# Patient Record
Sex: FEMALE | Race: BLACK OR AFRICAN AMERICAN | NOT HISPANIC OR LATINO | URBAN - METROPOLITAN AREA
[De-identification: names, ages, dates, MRNs, and addresses within clinical notes are randomized per-mention and may not be internally consistent; named-entity substitution may affect disease eponyms.]

---

## 2017-02-14 ENCOUNTER — HOSPITAL ENCOUNTER (OUTPATIENT)
Dept: RADIOLOGY | Facility: HOSPITAL | Age: 27
Discharge: HOME | End: 2017-02-14
Attending: OTOLARYNGOLOGY
Payer: MEDICAID

## 2017-02-14 DIAGNOSIS — J32.9 UNSPECIFIED SINUSITIS (CHRONIC): ICD-10-CM

## 2017-02-14 PROCEDURE — 70220 X-RAY EXAM OF SINUSES: CPT | Mod: TC,PO

## 2018-10-16 ENCOUNTER — HOSPITAL ENCOUNTER (EMERGENCY)
Facility: HOSPITAL | Age: 28
Discharge: HOME | End: 2018-10-16
Payer: MEDICAID

## 2018-10-16 VITALS
SYSTOLIC BLOOD PRESSURE: 135 MMHG | DIASTOLIC BLOOD PRESSURE: 64 MMHG | HEIGHT: 65 IN | TEMPERATURE: 98 F | OXYGEN SATURATION: 100 % | HEART RATE: 82 BPM | BODY MASS INDEX: 29.95 KG/M2 | RESPIRATION RATE: 20 BRPM

## 2018-10-16 DIAGNOSIS — G89.29 CHRONIC PAIN OF RIGHT ANKLE: ICD-10-CM

## 2018-10-16 DIAGNOSIS — S93.401A SPRAIN OF RIGHT ANKLE, UNSPECIFIED LIGAMENT, INITIAL ENCOUNTER: Primary | ICD-10-CM

## 2018-10-16 DIAGNOSIS — M25.571 ANKLE PAIN, RIGHT: ICD-10-CM

## 2018-10-16 DIAGNOSIS — M25.571 CHRONIC PAIN OF RIGHT ANKLE: ICD-10-CM

## 2018-10-16 PROCEDURE — 99283 EMERGENCY DEPT VISIT LOW MDM: CPT

## 2018-10-16 RX ORDER — IBUPROFEN 800 MG/1
800 TABLET ORAL EVERY 6 HOURS PRN
Qty: 20 TABLET | Refills: 0 | Status: SHIPPED | OUTPATIENT
Start: 2018-10-16 | End: 2018-12-27 | Stop reason: ALTCHOICE

## 2018-10-16 NOTE — ED PROVIDER NOTES
Encounter Date: 10/16/2018       History     Chief Complaint   Patient presents with    Ankle Pain     Pt reports recent fall a couple days ago. Pt reports right ankle pain     A 28-year-old female here today complaining of right ankle pain. Patient reports that approximately 7 months prior she had a fall which subsequently resulted in ankle pain as well as occasional giving out of her ankle.  She reports that she never received follow-up after that injury. Patient reports that 2 days ago she was walking when her ankle gave out again-causing her pain. She reports that she is able to bear weight on the affected extremity; however, pain is worse with lateral movement of the ankle.            Review of patient's allergies indicates:  No Known Allergies  Past Medical History:   Diagnosis Date    Breast disorder      Past Surgical History:   Procedure Laterality Date    BREAST SURGERY      HERNIA REPAIR      REDUCTION-MAMMOPLASTY-BILATERAL( 2 hour procedure) Bilateral 10/30/2014    Performed by Ramesh Ledesma MD at [facility] OR [address] [place] [address]    TONSILLECTOMY      5th Grade     Family History   Problem Relation Age of Onset    Diabetes Mother     Breast cancer Mother     Diabetes Maternal Grandmother     Diabetes Maternal Grandfather     Diabetes Paternal Grandmother      Social History     Tobacco Use    Smoking status: Never Smoker    Smokeless tobacco: Never Used   Substance Use Topics    Alcohol use: No    Drug use: No     Review of Systems   Musculoskeletal: Positive for arthralgias and joint swelling. Negative for back pain.   Skin: Negative for color change and rash.   Neurological: Positive for numbness ( occasional to sole of foot. ).   All other systems reviewed and are negative.      Physical Exam     Initial Vitals [10/16/18 1640]   BP Pulse Resp Temp SpO2   135/64 82 20 98.4 °F (36.9 °C) 100 %      MAP       --         Physical Exam    Nursing note and vitals reviewed.  Constitutional: She  appears well-developed and well-nourished. She is not diaphoretic. No distress.   HENT:   Head: Normocephalic and atraumatic.   Right Ear: External ear normal.   Left Ear: External ear normal.   Nose: Nose normal.   Mouth/Throat: Oropharynx is clear and moist.   Eyes: Conjunctivae and EOM are normal.   Neck: Normal range of motion.   Cardiovascular:   +2 DP pulse to right foot.    Pulmonary/Chest: No respiratory distress.   Respirations are even and non-labored.    Musculoskeletal:   Mild right lateral ankle tenderness with mild swelling. No joint laxity but pain with eversion.   Neurological: She is alert and oriented to person, place, and time.   Skin: Skin is warm and dry.   No ecchymosis    Psychiatric: She has a normal mood and affect. Her behavior is normal. Thought content normal.         ED Course   Orthopedic Injury  Date/Time: 10/16/2018 5:18 PM  Performed by: Kelsi Keen, Patient Care Assistant  Authorized by: Virginie Suggs NP     Injury:     Injury location:  Ankle    Injury type:  Soft tissue      Pre-procedure assessment:     Neurovascular status: Neurovascularly intact      Distal perfusion: normal      Neurological function: normal      Range of motion: normal        Selections made in this section will also lock the Injury type section above.:     Supplies used:  Elastic bandage  Post-procedure assessment:     Neurovascular status: Neurovascularly intact      Distal perfusion: normal      Neurological function: normal      Range of motion: unchanged      Patient tolerance:  Patient tolerated the procedure well with no immediate complications      Labs Reviewed - No data to display       Imaging Results          X-Ray Ankle Complete Right (Final result)  Result time 10/16/18 16:52:27    Final result by MICHAEL Owen Sr., MD (10/16/18 16:52:27)                 Impression:      Normal study.      Electronically signed by: Goran Owen MD  Date:    10/16/2018  Time:    16:52              Narrative:    EXAMINATION:  XR ANKLE COMPLETE 3 VIEW RIGHT    CLINICAL HISTORY:  Pain in right ankle and joints of right foot    COMPARISON:  None    FINDINGS:  There is no fracture. There is no dislocation.                                 Medical Decision Making:   Clinical Tests:   Radiological Study: Ordered and Reviewed  ED Management:  No acute bony abnormality seen on imaging.  However, due to patient's duration of injury and repeated laxity of joint, will refer to [facility].  Patient given disc of her imaging at discharge as well as contact information for Ortho.  Patient has a she faxed [facility].  Patient's ankle wrapped with Ace,  crutches provided, and patient to treat pain with NSAIDs and RICE.  Patient verbalized agreement and understanding of treatment plan prior to discharge.                      Clinical Impression:   1.  Sprain of right ankle.  2.  Chronic pain right ankle.    Disposition:   Disposition: Discharged                        Virginie Suggs NP  10/16/18 3094

## 2018-12-27 ENCOUNTER — HOSPITAL ENCOUNTER (EMERGENCY)
Facility: HOSPITAL | Age: 28
Discharge: HOME | End: 2018-12-27
Attending: EMERGENCY MEDICINE
Payer: MEDICAID

## 2018-12-27 VITALS
WEIGHT: 220 LBS | HEART RATE: 93 BPM | RESPIRATION RATE: 19 BRPM | TEMPERATURE: 98 F | HEIGHT: 65 IN | OXYGEN SATURATION: 99 % | BODY MASS INDEX: 36.65 KG/M2

## 2018-12-27 DIAGNOSIS — S00.83XA CONTUSION OF FACE, INITIAL ENCOUNTER: Primary | ICD-10-CM

## 2018-12-27 PROCEDURE — 99284 EMERGENCY DEPT VISIT MOD MDM: CPT

## 2018-12-27 RX ORDER — CYCLOBENZAPRINE HCL 10 MG
10 TABLET ORAL 3 TIMES DAILY PRN
Qty: 15 TABLET | Refills: 0 | Status: SHIPPED | OUTPATIENT
Start: 2018-12-27 | End: 2019-01-01

## 2018-12-27 RX ORDER — NAPROXEN 500 MG/1
500 TABLET ORAL 2 TIMES DAILY PRN
Qty: 30 TABLET | Refills: 0 | Status: SHIPPED | OUTPATIENT
Start: 2018-12-27 | End: 2019-06-06
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
## 2018-12-27 NOTE — DISCHARGE INSTRUCTIONS
Thank you for choosing [facility]! We appreciate you coming to us for your medical care. We hope you feel better soon! Please come back to [facility] for all of your future medical needs.      Sincerely,    Mario Graves MD  Medical Director  Emergency Department  [facility] [place]

## 2018-12-28 NOTE — ED PROVIDER NOTES
Encounter Date: 12/27/2018       History     Chief Complaint   Patient presents with    Facial Injury     Patients child threw her head back hitting patient on right side of face last night. Complains of left sided upper and lower jaw pain. No facial swelling noted.      HPI   This is a 28 y.o. female who has a past medical history of Breast disorder.   The patient presents to the Emergency Department with right facial injury.  Patient states that she was holding her 11-month-old child with that child threw her head back suddenly and hit the right side of her face last night.  Patient denies any pain at that time.  She states that she woke up with pain this morning associated with mild swelling.  She also has difficulty opening up her mouth chewing secondary to pain. She denies any broken teeth, bleeding in her mouth.    She has no history of previous symptoms.    Review of patient's allergies indicates:  No Known Allergies  Past Medical History:   Diagnosis Date    Breast disorder      Past Surgical History:   Procedure Laterality Date    BREAST SURGERY      HERNIA REPAIR      REDUCTION-MAMMOPLASTY-BILATERAL( 2 hour procedure) Bilateral 10/30/2014    Performed by Ramesh Ledesma MD at [facility] OR [address] [place] [address]    TONSILLECTOMY      5th Grade     Family History   Problem Relation Age of Onset    Diabetes Mother     Breast cancer Mother     Diabetes Maternal Grandmother     Diabetes Maternal Grandfather     Diabetes Paternal Grandmother      Social History     Tobacco Use    Smoking status: Never Smoker    Smokeless tobacco: Never Used   Substance Use Topics    Alcohol use: No    Drug use: No     Review of Systems   Constitutional: Negative for activity change.   HENT: Positive for facial swelling.         Facial pain   Musculoskeletal: Negative for neck pain.       Physical Exam     Initial Vitals [12/27/18 1507]   BP Pulse Resp Temp SpO2   -- 93 19 97.7 °F (36.5 °C) 99 %      MAP       --          Physical Exam    Nursing note and vitals reviewed.  Constitutional: She appears well-developed and well-nourished. She is not diaphoretic. No distress.   HENT:   Head: Normocephalic.   Mouth/Throat: Oropharynx is clear and moist.   Mild swelling and tenderness along the right masseter muscle/mandibular ramus.  Patient is able to open mouth to 2 fingers with.  There is no dental trauma/fractures, there are no intraoral lacerations.  Patient able to bite tongue depressor bilaterally, although stronger on the left.   Eyes: Conjunctivae are normal.   Cardiovascular: Normal rate, regular rhythm and intact distal pulses.   Pulmonary/Chest: No respiratory distress.   Musculoskeletal: Normal range of motion.   Neurological: She is alert and oriented to person, place, and time.   Skin: Skin is warm and dry. Capillary refill takes less than 2 seconds. No rash noted. No erythema.   Psychiatric: She has a normal mood and affect.         ED Course   Procedures  Labs Reviewed - No data to display       Imaging Results    None          Medical Decision Making:   Initial Assessment:   This is an emergent evaluation of a 28 y.o.female patient with presentation of right-sided jaw pain.  Patient has no instability, no trismus.   Initial differentials include but are not limited to:  Facial contusion, doubt mandibular fracture or TMJ dysfunction.  Patient had no pain initially and on exam has minimal swelling and is able to bite and open mouth normally.  Plan:  Pain control and discharge                        Clinical Impression:     1. Contusion of face, initial encounter                                  Mario Graves MD  12/27/18 [id number]

## 2019-02-19 DIAGNOSIS — N63.0 BREAST LUMP: Primary | ICD-10-CM

## 2019-02-20 ENCOUNTER — HOSPITAL ENCOUNTER (OUTPATIENT)
Dept: RADIOLOGY | Facility: HOSPITAL | Age: 29
Discharge: HOME | End: 2019-02-20
Attending: OBSTETRICS & GYNECOLOGY
Payer: MEDICAID

## 2019-02-20 DIAGNOSIS — N63.0 BREAST LUMP: ICD-10-CM

## 2019-02-20 PROCEDURE — 76642 ULTRASOUND BREAST LIMITED: CPT | Mod: TC,PO,RT

## 2019-06-03 ENCOUNTER — HOSPITAL ENCOUNTER (EMERGENCY)
Facility: HOSPITAL | Age: 29
Discharge: HOME | End: 2019-06-03
Attending: FAMILY MEDICINE
Payer: MEDICAID

## 2019-06-03 VITALS
HEART RATE: 89 BPM | SYSTOLIC BLOOD PRESSURE: 121 MMHG | TEMPERATURE: 99 F | RESPIRATION RATE: 20 BRPM | OXYGEN SATURATION: 100 % | HEIGHT: 65 IN | WEIGHT: 230 LBS | DIASTOLIC BLOOD PRESSURE: 64 MMHG | BODY MASS INDEX: 38.32 KG/M2

## 2019-06-03 DIAGNOSIS — N30.01 ACUTE CYSTITIS WITH HEMATURIA: Primary | ICD-10-CM

## 2019-06-03 LAB
ALBUMIN SERPL BCP-MCNC: 4.3 G/DL (ref 3.5–5.2)
ALP SERPL-CCNC: 86 U/L (ref 38–126)
ALT SERPL W/O P-5'-P-CCNC: 16 U/L (ref 10–44)
ANION GAP SERPL CALC-SCNC: 10 MMOL/L (ref 8–16)
AST SERPL-CCNC: 22 U/L (ref 15–46)
B-HCG UR QL: NEGATIVE
BACTERIA #/AREA URNS AUTO: ABNORMAL /HPF
BASOPHILS # BLD AUTO: 0.02 K/UL (ref 0–0.2)
BASOPHILS NFR BLD: 0.2 % (ref 0–1.9)
BILIRUB SERPL-MCNC: 0.3 MG/DL (ref 0.1–1)
BILIRUB UR QL STRIP: NEGATIVE
BUN SERPL-MCNC: 14 MG/DL (ref 7–17)
CALCIUM SERPL-MCNC: 9.7 MG/DL (ref 8.7–10.5)
CHLORIDE SERPL-SCNC: 101 MMOL/L (ref 95–110)
CLARITY UR REFRACT.AUTO: ABNORMAL
CO2 SERPL-SCNC: 28 MMOL/L (ref 23–29)
COLOR UR AUTO: ABNORMAL
CREAT SERPL-MCNC: 0.82 MG/DL (ref 0.5–1.4)
DIFFERENTIAL METHOD: ABNORMAL
EOSINOPHIL # BLD AUTO: 0.2 K/UL (ref 0–0.5)
EOSINOPHIL NFR BLD: 1.3 % (ref 0–8)
ERYTHROCYTE [DISTWIDTH] IN BLOOD BY AUTOMATED COUNT: 14.2 % (ref 11.5–14.5)
EST. GFR  (AFRICAN AMERICAN): >60 ML/MIN/1.73 M^2
EST. GFR  (NON AFRICAN AMERICAN): >60 ML/MIN/1.73 M^2
GLUCOSE SERPL-MCNC: 98 MG/DL (ref 70–110)
GLUCOSE UR QL STRIP: NEGATIVE
HCT VFR BLD AUTO: 36.4 % (ref 37–48.5)
HGB BLD-MCNC: 12 G/DL (ref 12–16)
HGB UR QL STRIP: ABNORMAL
HYALINE CASTS UR QL AUTO: 0 /LPF
KETONES UR QL STRIP: NEGATIVE
LEUKOCYTE ESTERASE UR QL STRIP: ABNORMAL
LYMPHOCYTES # BLD AUTO: 3.5 K/UL (ref 1–4.8)
LYMPHOCYTES NFR BLD: 29.9 % (ref 18–48)
MCH RBC QN AUTO: 27.1 PG (ref 27–31)
MCHC RBC AUTO-ENTMCNC: 33 G/DL (ref 32–36)
MCV RBC AUTO: 82 FL (ref 82–98)
MICROSCOPIC COMMENT: ABNORMAL
MONOCYTES # BLD AUTO: 0.7 K/UL (ref 0.3–1)
MONOCYTES NFR BLD: 5.6 % (ref 4–15)
NEUTROPHILS # BLD AUTO: 7.4 K/UL (ref 1.8–7.7)
NEUTROPHILS NFR BLD: 62.8 % (ref 38–73)
NITRITE UR QL STRIP: NEGATIVE
PH UR STRIP: 7 [PH] (ref 5–8)
PLATELET # BLD AUTO: 456 K/UL (ref 150–350)
PMV BLD AUTO: 9.8 FL (ref 9.2–12.9)
POTASSIUM SERPL-SCNC: 4 MMOL/L (ref 3.5–5.1)
PROT SERPL-MCNC: 8.3 G/DL (ref 6–8.4)
PROT UR QL STRIP: ABNORMAL
RBC # BLD AUTO: 4.42 M/UL (ref 4–5.4)
RBC #/AREA URNS AUTO: >100 /HPF (ref 0–4)
SODIUM SERPL-SCNC: 139 MMOL/L (ref 136–145)
SP GR UR STRIP: 1.01 (ref 1–1.03)
URN SPEC COLLECT METH UR: ABNORMAL
UROBILINOGEN UR STRIP-ACNC: NEGATIVE EU/DL
WBC # BLD AUTO: 11.81 K/UL (ref 3.9–12.7)
WBC #/AREA URNS AUTO: 20 /HPF (ref 0–5)
WBC CLUMPS UR QL AUTO: ABNORMAL

## 2019-06-03 PROCEDURE — 81025 URINE PREGNANCY TEST: CPT | Mod: ER

## 2019-06-03 PROCEDURE — 63600175 PHARM REV CODE 636 W HCPCS: Mod: ER | Performed by: FAMILY MEDICINE

## 2019-06-03 PROCEDURE — 87186 SC STD MICRODIL/AGAR DIL: CPT | Mod: ER

## 2019-06-03 PROCEDURE — 87077 CULTURE AEROBIC IDENTIFY: CPT | Mod: ER

## 2019-06-03 PROCEDURE — 80053 COMPREHEN METABOLIC PANEL: CPT | Mod: ER

## 2019-06-03 PROCEDURE — 96361 HYDRATE IV INFUSION ADD-ON: CPT | Mod: ER

## 2019-06-03 PROCEDURE — 87086 URINE CULTURE/COLONY COUNT: CPT | Mod: ER

## 2019-06-03 PROCEDURE — 99284 EMERGENCY DEPT VISIT MOD MDM: CPT | Mod: 25,ER

## 2019-06-03 PROCEDURE — 87040 BLOOD CULTURE FOR BACTERIA: CPT | Mod: ER

## 2019-06-03 PROCEDURE — 96365 THER/PROPH/DIAG IV INF INIT: CPT | Mod: ER

## 2019-06-03 PROCEDURE — 81000 URINALYSIS NONAUTO W/SCOPE: CPT | Mod: ER

## 2019-06-03 PROCEDURE — 25000003 PHARM REV CODE 250: Mod: ER | Performed by: FAMILY MEDICINE

## 2019-06-03 PROCEDURE — 96375 TX/PRO/DX INJ NEW DRUG ADDON: CPT | Mod: ER

## 2019-06-03 PROCEDURE — 85025 COMPLETE CBC W/AUTO DIFF WBC: CPT | Mod: ER

## 2019-06-03 PROCEDURE — 87088 URINE BACTERIA CULTURE: CPT | Mod: ER

## 2019-06-03 RX ORDER — TRAMADOL HYDROCHLORIDE 50 MG/1
50 TABLET ORAL EVERY 8 HOURS PRN
Qty: 9 TABLET | Refills: 0 | Status: SHIPPED | OUTPATIENT
Start: 2019-06-03

## 2019-06-03 RX ORDER — SULFAMETHOXAZOLE AND TRIMETHOPRIM 800; 160 MG/1; MG/1
1 TABLET ORAL 2 TIMES DAILY
Qty: 20 TABLET | Refills: 0 | Status: SHIPPED | OUTPATIENT
Start: 2019-06-03 | End: 2019-06-13

## 2019-06-03 RX ORDER — ONDANSETRON 4 MG/1
4 TABLET, ORALLY DISINTEGRATING ORAL
Status: COMPLETED | OUTPATIENT
Start: 2019-06-03 | End: 2019-06-03

## 2019-06-03 RX ORDER — SULFAMETHOXAZOLE AND TRIMETHOPRIM 800; 160 MG/1; MG/1
1 TABLET ORAL
Status: COMPLETED | OUTPATIENT
Start: 2019-06-03 | End: 2019-06-03

## 2019-06-03 RX ORDER — ACETAMINOPHEN 325 MG/1
650 TABLET ORAL
Status: COMPLETED | OUTPATIENT
Start: 2019-06-03 | End: 2019-06-03

## 2019-06-03 RX ORDER — KETOROLAC TROMETHAMINE 30 MG/ML
15 INJECTION, SOLUTION INTRAMUSCULAR; INTRAVENOUS
Status: COMPLETED | OUTPATIENT
Start: 2019-06-03 | End: 2019-06-03

## 2019-06-03 RX ADMIN — ACETAMINOPHEN 650 MG: 325 TABLET ORAL at 01:06

## 2019-06-03 RX ADMIN — ONDANSETRON 4 MG: 4 TABLET, ORALLY DISINTEGRATING ORAL at 01:06

## 2019-06-03 RX ADMIN — KETOROLAC TROMETHAMINE 15 MG: 30 INJECTION, SOLUTION INTRAMUSCULAR at 05:06

## 2019-06-03 RX ADMIN — SULFAMETHOXAZOLE AND TRIMETHOPRIM 1 TABLET: 800; 160 TABLET ORAL at 02:06

## 2019-06-03 RX ADMIN — SODIUM CHLORIDE 1000 ML: 0.9 INJECTION, SOLUTION INTRAVENOUS at 02:06

## 2019-06-03 RX ADMIN — CEFTRIAXONE 1 G: 1 INJECTION, SOLUTION INTRAVENOUS at 02:06

## 2019-06-03 NOTE — ED PROVIDER NOTES
Encounter Date: 6/3/2019       History     Chief Complaint   Patient presents with    Back Pain     pt to er c/o lower back pain with hematuria and dysuria for few hours--denies trauma--pt finished Amoxil yesterday--reports nausea      29-year-old female complains of low back pain, suprapubic pain and dysuria.  Complains of fever with chills and hematuria.  She has not taken any medications so far.  Patient was recently taking amoxicillin for to take and finished few days ago.    The history is provided by the patient.     Review of patient's allergies indicates:  No Known Allergies  Past Medical History:   Diagnosis Date    Breast disorder      Past Surgical History:   Procedure Laterality Date    BREAST SURGERY      HERNIA REPAIR      REDUCTION-MAMMOPLASTY-BILATERAL( 2 hour procedure) Bilateral 10/30/2014    Performed by Ramesh Led[facility]ma MD at [facility] OR [address] [place] [address]    TONSILLECTOMY      5th Grade     Family History   Problem Relation Age of Onset    Diabetes Mother     Breast cancer Mother     Diabetes Maternal Grandmother     Diabetes Maternal Grandfather     Diabetes Paternal Grandmother      Social History     Tobacco Use    Smoking status: Never Smoker    Smokeless tobacco: Never Used   Substance Use Topics    Alcohol use: No    Drug use: No     Review of Systems   Constitutional: Positive for fever. Negative for activity change, appetite change and chills.   HENT: Negative for congestion, ear discharge, rhinorrhea, sinus pressure, sinus pain, sore throat and trouble swallowing.    Eyes: Negative for photophobia, pain, discharge, redness, itching and visual disturbance.   Respiratory: Negative for cough, chest tightness, shortness of breath and wheezing.    Cardiovascular: Negative for chest pain, palpitations and leg swelling.   Gastrointestinal: Negative for abdominal distention, abdominal pain, constipation, diarrhea, nausea and vomiting.   Genitourinary: Positive for dysuria, flank pain,  frequency and hematuria. Negative for decreased urine volume, vaginal bleeding, vaginal discharge and vaginal pain.   Musculoskeletal: Positive for back pain. Negative for gait problem, neck pain and neck stiffness.   Skin: Negative for rash and wound.   Neurological: Negative for dizziness, tremors, seizures, syncope, speech difficulty, weakness, light-headedness, numbness and headaches.   Psychiatric/Behavioral: Negative for behavioral problems, confusion, hallucinations and sleep disturbance. The patient is not nervous/anxious.    All other systems reviewed and are negative.      Physical Exam     Initial Vitals [06/03/19 0106]   BP Pulse Resp Temp SpO2   139/69 105 18 100.2 °F (37.9 °C) 100 %      MAP       --         Physical Exam    Nursing note and vitals reviewed.  Constitutional: Vital signs are normal. She appears well-developed and well-nourished. She is active.   HENT:   Head: Normocephalic and atraumatic.   Nose: Nose normal.   Mouth/Throat: Oropharynx is clear and moist.   Eyes: Conjunctivae and lids are normal.   Neck: Trachea normal, normal range of motion and full passive range of motion without pain. Neck supple. Normal range of motion present. No neck rigidity.   Cardiovascular: Normal rate, regular rhythm, S1 normal, S2 normal, normal heart sounds, intact distal pulses and normal pulses. Exam reveals no friction rub.    No murmur heard.  Pulmonary/Chest: Breath sounds normal. No respiratory distress. She has no wheezes. She has no rales. She exhibits no tenderness.   Abdominal: Soft. Normal appearance and bowel sounds are normal. She exhibits no distension. There is tenderness.       Musculoskeletal: Normal range of motion. She exhibits no edema or tenderness.   Lymphadenopathy:     She has no cervical adenopathy.   Neurological: She is alert and oriented to person, place, and time. She has normal strength and normal reflexes. She displays normal reflexes. No cranial nerve deficit or sensory  deficit. GCS score is 15. GCS eye subscore is 4. GCS verbal subscore is 5. GCS motor subscore is 6.   Skin: Skin is warm and intact. Capillary refill takes less than 2 seconds. No abrasion, no bruising and no rash noted.   Psychiatric: She has a normal mood and affect. Her speech is normal and behavior is normal. Judgment and thought content normal. She is not actively hallucinating. Cognition and memory are normal. She is attentive.         ED Course   Procedures  Labs Reviewed   URINALYSIS, REFLEX TO URINE CULTURE          Imaging Results    None          Medical Decision Making:   Initial Assessment:   29-year-old female presents with dysuria, hematuria, low back pain. Patient had fever of 101.  Mild nausea but no vomiting.  Differential Diagnosis:   Pyelonephritis, kidney stone, acute cystitis, urinary tract infection, hematuria bladder cancer.  Clinical Tests:   Lab Tests: Ordered and Reviewed  Radiological Study: Ordered and Reviewed  ED Management:  Patient had significant blood in the urine associated with urinary tract infection.  CT negative for pyelonephritis or kidney stone.  Hydrated with IV fluids, Rocephin and Bactrim.  Urine has been sent for culture.  Patient is advised to drink plenty of water.  Continue Bactrim.  Ultram for severe pain. Follow up with primary care physician or to the ER if symptoms persist or worsen.                      Clinical Impression:       ICD-10-CM ICD-9-CM   1. Acute cystitis with hematuria N30.01 595.0         Disposition:   Disposition: Discharged  Condition: Stable                        Grzegorz Montgomery MD  06/03/19 2754

## 2019-06-06 ENCOUNTER — HOSPITAL ENCOUNTER (EMERGENCY)
Facility: HOSPITAL | Age: 29
Discharge: HOME | End: 2019-06-06
Attending: EMERGENCY MEDICINE
Payer: MEDICAID

## 2019-06-06 VITALS
HEIGHT: 65 IN | DIASTOLIC BLOOD PRESSURE: 66 MMHG | OXYGEN SATURATION: 99 % | HEART RATE: 94 BPM | BODY MASS INDEX: 36.65 KG/M2 | TEMPERATURE: 99 F | WEIGHT: 220 LBS | RESPIRATION RATE: 16 BRPM | SYSTOLIC BLOOD PRESSURE: 134 MMHG

## 2019-06-06 DIAGNOSIS — N12 PYELONEPHRITIS: Primary | ICD-10-CM

## 2019-06-06 LAB
ANION GAP SERPL CALC-SCNC: 8 MMOL/L (ref 8–16)
B-HCG UR QL: NEGATIVE
BACTERIA #/AREA URNS HPF: ABNORMAL /HPF
BACTERIA UR CULT: NORMAL
BASOPHILS # BLD AUTO: 0.01 K/UL (ref 0–0.2)
BASOPHILS NFR BLD: 0.1 % (ref 0–1.9)
BILIRUB UR QL STRIP: NEGATIVE
BUN SERPL-MCNC: 12 MG/DL (ref 6–20)
CALCIUM SERPL-MCNC: 9.8 MG/DL (ref 8.7–10.5)
CHLORIDE SERPL-SCNC: 103 MMOL/L (ref 95–110)
CLARITY UR: ABNORMAL
CO2 SERPL-SCNC: 25 MMOL/L (ref 23–29)
COLOR UR: YELLOW
CREAT SERPL-MCNC: 0.9 MG/DL (ref 0.5–1.4)
CTP QC/QA: YES
DIFFERENTIAL METHOD: ABNORMAL
EOSINOPHIL # BLD AUTO: 0.1 K/UL (ref 0–0.5)
EOSINOPHIL NFR BLD: 0.7 % (ref 0–8)
ERYTHROCYTE [DISTWIDTH] IN BLOOD BY AUTOMATED COUNT: 13.8 % (ref 11.5–14.5)
EST. GFR  (AFRICAN AMERICAN): >60 ML/MIN/1.73 M^2
EST. GFR  (NON AFRICAN AMERICAN): >60 ML/MIN/1.73 M^2
GLUCOSE SERPL-MCNC: 86 MG/DL (ref 70–110)
GLUCOSE UR QL STRIP: NEGATIVE
HCT VFR BLD AUTO: 37 % (ref 37–48.5)
HGB BLD-MCNC: 12.1 G/DL (ref 12–16)
HGB UR QL STRIP: ABNORMAL
KETONES UR QL STRIP: NEGATIVE
LEUKOCYTE ESTERASE UR QL STRIP: ABNORMAL
LYMPHOCYTES # BLD AUTO: 2.3 K/UL (ref 1–4.8)
LYMPHOCYTES NFR BLD: 27.7 % (ref 18–48)
MCH RBC QN AUTO: 27.1 PG (ref 27–31)
MCHC RBC AUTO-ENTMCNC: 32.7 G/DL (ref 32–36)
MCV RBC AUTO: 83 FL (ref 82–98)
MICROSCOPIC COMMENT: ABNORMAL
MONOCYTES # BLD AUTO: 0.5 K/UL (ref 0.3–1)
MONOCYTES NFR BLD: 5.6 % (ref 4–15)
NEUTROPHILS # BLD AUTO: 5.5 K/UL (ref 1.8–7.7)
NEUTROPHILS NFR BLD: 65.5 % (ref 38–73)
NITRITE UR QL STRIP: NEGATIVE
PH UR STRIP: 7 [PH] (ref 5–8)
PLATELET # BLD AUTO: 426 K/UL (ref 150–350)
PMV BLD AUTO: 9.2 FL (ref 9.2–12.9)
POTASSIUM SERPL-SCNC: 3.8 MMOL/L (ref 3.5–5.1)
PROT UR QL STRIP: NEGATIVE
RBC # BLD AUTO: 4.46 M/UL (ref 4–5.4)
RBC #/AREA URNS HPF: 5 /HPF (ref 0–4)
SODIUM SERPL-SCNC: 136 MMOL/L (ref 136–145)
SP GR UR STRIP: <=1.005 (ref 1–1.03)
URN SPEC COLLECT METH UR: ABNORMAL
UROBILINOGEN UR STRIP-ACNC: NEGATIVE EU/DL
WBC # BLD AUTO: 8.34 K/UL (ref 3.9–12.7)
WBC #/AREA URNS HPF: 35 /HPF (ref 0–5)

## 2019-06-06 PROCEDURE — 87086 URINE CULTURE/COLONY COUNT: CPT

## 2019-06-06 PROCEDURE — 85025 COMPLETE CBC W/AUTO DIFF WBC: CPT

## 2019-06-06 PROCEDURE — 81025 URINE PREGNANCY TEST: CPT | Performed by: PHYSICIAN ASSISTANT

## 2019-06-06 PROCEDURE — 87186 SC STD MICRODIL/AGAR DIL: CPT

## 2019-06-06 PROCEDURE — 87077 CULTURE AEROBIC IDENTIFY: CPT

## 2019-06-06 PROCEDURE — 81000 URINALYSIS NONAUTO W/SCOPE: CPT

## 2019-06-06 PROCEDURE — 96375 TX/PRO/DX INJ NEW DRUG ADDON: CPT

## 2019-06-06 PROCEDURE — 63600175 PHARM REV CODE 636 W HCPCS: Performed by: PHYSICIAN ASSISTANT

## 2019-06-06 PROCEDURE — 87088 URINE BACTERIA CULTURE: CPT

## 2019-06-06 PROCEDURE — 25000003 PHARM REV CODE 250: Performed by: PHYSICIAN ASSISTANT

## 2019-06-06 PROCEDURE — 99284 EMERGENCY DEPT VISIT MOD MDM: CPT | Mod: 25

## 2019-06-06 PROCEDURE — 80048 BASIC METABOLIC PNL TOTAL CA: CPT

## 2019-06-06 PROCEDURE — 96365 THER/PROPH/DIAG IV INF INIT: CPT

## 2019-06-06 RX ORDER — CIPROFLOXACIN 2 MG/ML
400 INJECTION, SOLUTION INTRAVENOUS
Status: COMPLETED | OUTPATIENT
Start: 2019-06-06 | End: 2019-06-06

## 2019-06-06 RX ORDER — PHENAZOPYRIDINE HYDROCHLORIDE 100 MG/1
200 TABLET, FILM COATED ORAL
Status: COMPLETED | OUTPATIENT
Start: 2019-06-06 | End: 2019-06-06

## 2019-06-06 RX ORDER — NAPROXEN 500 MG/1
500 TABLET ORAL 2 TIMES DAILY WITH MEALS
Qty: 14 TABLET | Refills: 0 | Status: SHIPPED | OUTPATIENT
Start: 2019-06-06 | End: 2022-01-01

## 2019-06-06 RX ORDER — PHENAZOPYRIDINE HYDROCHLORIDE 200 MG/1
200 TABLET, FILM COATED ORAL 3 TIMES DAILY
Qty: 6 TABLET | Refills: 0 | Status: SHIPPED | OUTPATIENT
Start: 2019-06-06 | End: 2019-06-08

## 2019-06-06 RX ORDER — CIPROFLOXACIN 500 MG/1
500 TABLET ORAL 2 TIMES DAILY
Qty: 14 TABLET | Refills: 0 | Status: SHIPPED | OUTPATIENT
Start: 2019-06-06 | End: 2019-06-13

## 2019-06-06 RX ORDER — KETOROLAC TROMETHAMINE 30 MG/ML
15 INJECTION, SOLUTION INTRAMUSCULAR; INTRAVENOUS
Status: COMPLETED | OUTPATIENT
Start: 2019-06-06 | End: 2019-06-06

## 2019-06-06 RX ADMIN — CIPROFLOXACIN 400 MG: 2 INJECTION, SOLUTION INTRAVENOUS at 05:06

## 2019-06-06 RX ADMIN — PHENAZOPYRIDINE HYDROCHLORIDE 200 MG: 100 TABLET ORAL at 05:06

## 2019-06-06 RX ADMIN — KETOROLAC TROMETHAMINE 15 MG: 30 INJECTION, SOLUTION INTRAMUSCULAR at 05:06

## 2019-06-06 RX ADMIN — SODIUM CHLORIDE 1000 ML: 0.9 INJECTION, SOLUTION INTRAVENOUS at 05:06

## 2019-06-06 NOTE — ED TRIAGE NOTES
28 Y/O F with CC of Flank pain. Reports pain to jones flanks, worse to R side, dysuria, frequency and hematuria. Was seen and treated with Bactrim for UTI. States no improvement in symptoms. No other complaints verbalized. NAD noted. Will continue to monitor.

## 2019-06-06 NOTE — ED PROVIDER NOTES
Encounter Date: 6/6/2019       History     Chief Complaint   Patient presents with    Flank Pain     right flank pain, hematuria, and lower back pain for 3-4 days.  Patient was seen at other facility and not any better.     Nadia Mary, a 29 y.o. female  has a past medical history of Breast disorder.     She presents to the ED evaluation of right flank pain. Patient was seen at [facility] on 06/03/19 in diagnosed with a bladder infection.  Upon chart review patient was placed on Bactrim which showed resistance upon culture.  Attempt was made to contact patient however was unsuccessful so patient has not had prescription changed.  She return to the ER today complaining of increased dysuria and urgency with urination.  Denies any fever or vomiting.      The history is provided by the patient.     Review of patient's allergies indicates:  No Known Allergies  Past Medical History:   Diagnosis Date    Breast disorder      Past Surgical History:   Procedure Laterality Date    BREAST SURGERY      HERNIA REPAIR      REDUCTION-MAMMOPLASTY-BILATERAL( 2 hour procedure) Bilateral 10/30/2014    Performed by Ramesh Led[facility] MD at [facility] OR [address] [place] [address]    TONSILLECTOMY      5th Grade     Family History   Problem Relation Age of Onset    Diabetes Mother     Breast cancer Mother     Diabetes Maternal Grandmother     Diabetes Maternal Grandfather     Diabetes Paternal Grandmother      Social History     Tobacco Use    Smoking status: Never Smoker    Smokeless tobacco: Never Used   Substance Use Topics    Alcohol use: No    Drug use: No     Review of Systems   Constitutional: Negative for fever.   Gastrointestinal: Negative for nausea and vomiting.   Genitourinary: Positive for dysuria, flank pain (right sided), frequency, hematuria and urgency.   Skin: Negative for color change and rash.   Allergic/Immunologic: Negative for immunocompromised state.   Psychiatric/Behavioral: Negative for agitation.   All  other systems reviewed and are negative.      Physical Exam     Initial Vitals [06/06/19 1629]   BP Pulse Resp Temp SpO2   134/66 94 16 99 °F (37.2 °C) 99 %      MAP       --         Physical Exam    Nursing note and vitals reviewed.  Constitutional: She appears well-developed and well-nourished.   HENT:   Head: Normocephalic and atraumatic.   Right Ear: External ear normal.   Left Ear: External ear normal.   Nose: Nose normal.   Eyes: Conjunctivae and EOM are normal.   Neck: Normal range of motion.   Cardiovascular: Normal rate and regular rhythm.   Pulmonary/Chest: Breath sounds normal. No respiratory distress. She has no wheezes. She has no rhonchi. She has no rales.   Abdominal: Soft. Bowel sounds are normal. She exhibits no distension. There is no rebound.   Musculoskeletal: Normal range of motion.   Neurological: She is alert and oriented to person, place, and time.   Skin: Skin is warm and dry. Capillary refill takes less than 2 seconds. No rash noted. No erythema.   Psychiatric: She has a normal mood and affect. Thought content normal.         ED Course   Procedures  Labs Reviewed   URINALYSIS, REFLEX TO URINE CULTURE - Abnormal; Notable for the following components:       Result Value    Appearance, UA Hazy (*)     Specific Gravity, UA <=1.005 (*)     Occult Blood UA 3+ (*)     Leukocytes, UA 2+ (*)     All other components within normal limits    Narrative:     Preferred Collection Type->Urine, Clean Catch   CBC W/ AUTO DIFFERENTIAL - Abnormal; Notable for the following components:    Platelets 426 (*)     All other components within normal limits   URINALYSIS MICROSCOPIC - Abnormal; Notable for the following components:    RBC, UA 5 (*)     WBC, UA 35 (*)     Bacteria Few (*)     All other components within normal limits    Narrative:     Preferred Collection Type->Urine, Clean Catch   CULTURE, URINE   BASIC METABOLIC PANEL   POCT URINE PREGNANCY          Imaging Results    None          Medical Decision  Making:   Initial Assessment:   Dysuria, urgency, right sided flank pain   Differential Diagnosis:   UA, pyelo, nephrolithiasis  Clinical Tests:   Lab Tests: Reviewed and Ordered  The following lab test(s) were unremarkable: CBC, BMP, Urinalysis and UPT  ED Management:  Patient presents to the ER for evaluation of continued dysuria as well as right flank pain. Fluids, Toradol, pyridium  and Cipro were given based off of patient's previous urine culture and sensitivity.  CBC and BMP showed no acute abnormalities.  UA shows shows evidence of continued bladder infection.  Patient was instructed to increase hydration, ceased taking the remainder of her Bactrim antibiotic and to have repeat urine after completion of her Cipro antibiotic.  Instructed to return with any new or worsening symptoms including fever, increased pain nausea or vomiting. Patient verbalized understanding and agreement with plan.                      Clinical Impression:       ICD-10-CM ICD-9-CM   1. Pyelonephritis N12 590.80                                Deepa Weiner PA-C  06/07/19 7297

## 2019-06-08 LAB — BACTERIA BLD CULT: NORMAL

## 2019-06-09 LAB — BACTERIA UR CULT: NORMAL

## 2021-04-17 ENCOUNTER — OUTSIDE PLACE OF SERVICE (OUTPATIENT)
Dept: CARDIOLOGY | Facility: CLINIC | Age: 31
End: 2021-04-17
Payer: MEDICAID

## 2021-04-17 PROCEDURE — 93010 PR ELECTROCARDIOGRAM REPORT: ICD-10-PCS | Mod: ,,, | Performed by: INTERNAL MEDICINE

## 2021-04-17 PROCEDURE — 93010 ELECTROCARDIOGRAM REPORT: CPT | Mod: ,,, | Performed by: INTERNAL MEDICINE

## 2022-01-01 ENCOUNTER — HOSPITAL ENCOUNTER (EMERGENCY)
Facility: HOSPITAL | Age: 32
Discharge: HOME | End: 2022-01-01
Attending: EMERGENCY MEDICINE
Payer: MEDICAID

## 2022-01-01 VITALS
DIASTOLIC BLOOD PRESSURE: 68 MMHG | BODY MASS INDEX: 41.1 KG/M2 | HEART RATE: 106 BPM | WEIGHT: 247 LBS | SYSTOLIC BLOOD PRESSURE: 149 MMHG | RESPIRATION RATE: 20 BRPM | TEMPERATURE: 99 F | OXYGEN SATURATION: 99 %

## 2022-01-01 DIAGNOSIS — B34.9 VIRAL SYNDROME: ICD-10-CM

## 2022-01-01 DIAGNOSIS — Z20.822 SUSPECTED COVID-19 VIRUS INFECTION: Primary | ICD-10-CM

## 2022-01-01 LAB — GROUP A STREP, MOLECULAR: NEGATIVE

## 2022-01-01 PROCEDURE — 99282 EMERGENCY DEPT VISIT SF MDM: CPT | Mod: ER

## 2022-01-01 PROCEDURE — U0003 INFECTIOUS AGENT DETECTION BY NUCLEIC ACID (DNA OR RNA); SEVERE ACUTE RESPIRATORY SYNDROME CORONAVIRUS 2 (SARS-COV-2) (CORONAVIRUS DISEASE [COVID-19]), AMPLIFIED PROBE TECHNIQUE, MAKING USE OF HIGH THROUGHPUT TECHNOLOGIES AS DESCRIBED BY CMS-2020-01-R: HCPCS | Mod: ER | Performed by: PHYSICIAN ASSISTANT

## 2022-01-01 PROCEDURE — 87651 STREP A DNA AMP PROBE: CPT | Mod: ER | Performed by: PHYSICIAN ASSISTANT

## 2022-01-01 NOTE — Clinical Note
"Nadia"Saqib Mary was seen and treated in our emergency department on 1/1/2022.     COVID-19 is present in our communities across the state. There is limited testing for COVID at this time, so not all patients can be tested. In this situation, your employee meets the following criteria:    Nadia Mary has met the criteria for COVID-19 testing based upon symptoms, travel, and/or potential exposure. The test has been completed and is pending results at this time. During this time the employee is not able to work and should be quarantined per the Centers for Disease Control timelines.     If you have any questions or concerns, or if I can be of further assistance, please do not hesitate to contact me.    Sincerely,             Svetlana Orourke PA-C"

## 2022-01-02 NOTE — ED PROVIDER NOTES
Encounter Date: 1/1/2022       History     Chief Complaint   Patient presents with    Fever     Reports to ED c c/o fever and sore throat. +Nonproductive cough. States fever of 101 at home. Denies taking any OTC medications      Patient is a 31-year-old female who presents to ED with complaints of fever and sore throat.  Also reports nonproductive cough.  Reports symptoms present for approximately 3-4 days.  No OTC treatments.  Denies any chest pain, shortness of breath, nausea, vomiting, diarrhea.  Recent COVID exposure.        Review of patient's allergies indicates:  No Known Allergies  Past Medical History:   Diagnosis Date    Breast disorder      Past Surgical History:   Procedure Laterality Date    BREAST SURGERY      HERNIA REPAIR      TONSILLECTOMY      5th Grade     Family History   Problem Relation Age of Onset    Diabetes Mother     Breast cancer Mother     Diabetes Maternal Grandmother     Diabetes Maternal Grandfather     Diabetes Paternal Grandmother      Social History     Tobacco Use    Smoking status: Never Smoker    Smokeless tobacco: Never Used   Substance Use Topics    Alcohol use: No    Drug use: No     Review of Systems   Constitutional: Positive for fever. Negative for chills, diaphoresis and fatigue.   HENT: Positive for sore throat. Negative for congestion and rhinorrhea.    Respiratory: Positive for cough. Negative for shortness of breath.    Cardiovascular: Negative for chest pain.   Gastrointestinal: Negative for diarrhea, nausea and vomiting.   Musculoskeletal: Negative for arthralgias, back pain and myalgias.   Skin: Negative for rash.   Neurological: Negative for weakness and headaches.       Physical Exam     Initial Vitals [01/01/22 1921]   BP Pulse Resp Temp SpO2   (!) 149/68 106 20 99.3 °F (37.4 °C) 99 %      MAP       --         Physical Exam    Nursing note and vitals reviewed.  Constitutional: She appears well-developed and well-nourished. She is not diaphoretic.  No distress.   HENT:   Head: Normocephalic and atraumatic.   Eyes: Conjunctivae and EOM are normal. Pupils are equal, round, and reactive to light.   Neck: Neck supple.   Normal range of motion.  Cardiovascular: Normal rate, regular rhythm, normal heart sounds and intact distal pulses.   Pulmonary/Chest: Breath sounds normal. No respiratory distress.   Abdominal: Abdomen is soft. Bowel sounds are normal. There is no abdominal tenderness.   Musculoskeletal:         General: No tenderness or edema. Normal range of motion.      Cervical back: Normal range of motion and neck supple.     Neurological: She is alert and oriented to person, place, and time. She has normal strength. GCS score is 15. GCS eye subscore is 4. GCS verbal subscore is 5. GCS motor subscore is 6.   Skin: Skin is warm. Capillary refill takes less than 2 seconds. No rash noted.         ED Course   Procedures  Labs Reviewed   GROUP A STREP, MOLECULAR   SARS-COV-2 (COVID-19) QUALITATIVE PCR          Imaging Results    None          Medications - No data to display  Medical Decision Making:   ED Management:  COVID pending.   Vital signs are stable, nontoxic appearing.  No respiratory distress.  Patient will be discharged home.  Discussed CDC guidelines if patient were to test positive for COVID.  Discussed symptomatic and supportive care measures for viral illness.  ED precautions were discussed to return for any worsening or change in symptoms.  Patient voiced understanding and agreed with the plan of care.  All questions were answered.                        Clinical Impression:   Final diagnoses:  [Z20.822] Suspected COVID-19 virus infection (Primary)  [B34.9] Viral syndrome          ED Disposition Condition    Discharge Stable        ED Prescriptions     None        Follow-up Information     Follow up With Specialties Details Why Contact Info    Deisy Pino MD Family Medicine   [address] [facility]  [place]  [place]  [address]  [phone]             Svetlana Orourke PA-C  01/01/22 7499

## 2022-01-02 NOTE — DISCHARGE INSTRUCTIONS
Drink plenty of fluids to keep hydrated.   Take vitamin C, D and Zinc.  Sleep in the prone position. Walk around frequently to keep your lungs moving and expanding.    Take tylenol or motrin as needed for body aches, fever, and pain.   Self isolate per the CDC guidelines.   Return to ED for any shortness of breath, confusion, or severe weakness.

## 2022-01-04 LAB
SARS-COV-2 RNA RESP QL NAA+PROBE: NOT DETECTED
SARS-COV-2- CYCLE NUMBER: NORMAL

## 2022-10-17 ENCOUNTER — HOSPITAL ENCOUNTER (EMERGENCY)
Facility: HOSPITAL | Age: 32
Discharge: HOME | End: 2022-10-17
Attending: EMERGENCY MEDICINE
Payer: MEDICAID

## 2022-10-17 VITALS
DIASTOLIC BLOOD PRESSURE: 78 MMHG | BODY MASS INDEX: 42.49 KG/M2 | WEIGHT: 255 LBS | HEART RATE: 104 BPM | TEMPERATURE: 98 F | SYSTOLIC BLOOD PRESSURE: 142 MMHG | HEIGHT: 65 IN | RESPIRATION RATE: 20 BRPM | OXYGEN SATURATION: 98 %

## 2022-10-17 DIAGNOSIS — J10.1 INFLUENZA B: Primary | ICD-10-CM

## 2022-10-17 LAB
GROUP A STREP, MOLECULAR: NEGATIVE
INFLUENZA A, MOLECULAR: NEGATIVE
INFLUENZA B, MOLECULAR: POSITIVE
SARS-COV-2 RDRP RESP QL NAA+PROBE: NEGATIVE
SPECIMEN SOURCE: ABNORMAL

## 2022-10-17 PROCEDURE — U0002 COVID-19 LAB TEST NON-CDC: HCPCS | Mod: ER | Performed by: EMERGENCY MEDICINE

## 2022-10-17 PROCEDURE — 99282 EMERGENCY DEPT VISIT SF MDM: CPT | Mod: ER

## 2022-10-17 PROCEDURE — 87651 STREP A DNA AMP PROBE: CPT | Mod: ER | Performed by: EMERGENCY MEDICINE

## 2022-10-17 PROCEDURE — 87502 INFLUENZA DNA AMP PROBE: CPT | Mod: ER | Performed by: EMERGENCY MEDICINE

## 2022-10-17 NOTE — ED PROVIDER NOTES
Encounter Date: 10/17/2022       History     Chief Complaint   Patient presents with    Fever     Fever, sore throat and cough x 1 week. Pregnant- due date 2/14. + fetal movement. Denies vaginal bleeding or discharge. Denies abd pain     32-year-old female approximately 23 weeks pregnant presenting with 1 week of fever, sore throat, cough, body aches.  Patient denies headache, chest pain, shortness of breath, abdominal pain, dysuria, vaginal discharge or bleeding.  Daughter has similar symptoms.    Review of patient's allergies indicates:  No Known Allergies  Past Medical History:   Diagnosis Date    Breast disorder      Past Surgical History:   Procedure Laterality Date    BREAST SURGERY      HERNIA REPAIR      TONSILLECTOMY      5th Grade     Family History   Problem Relation Age of Onset    Diabetes Mother     Breast cancer Mother     Diabetes Maternal Grandmother     Diabetes Maternal Grandfather     Diabetes Paternal Grandmother      Social History     Tobacco Use    Smoking status: Never    Smokeless tobacco: Never   Substance Use Topics    Alcohol use: No    Drug use: No     Review of Systems   Constitutional:  Positive for fever.   HENT:  Positive for sore throat.    Eyes:  Negative for pain.   Respiratory:  Positive for cough. Negative for shortness of breath.    Cardiovascular:  Negative for chest pain.   Gastrointestinal:  Negative for abdominal pain, nausea and vomiting.   Genitourinary:  Negative for difficulty urinating.   Musculoskeletal:  Positive for myalgias. Negative for back pain and neck pain.   Neurological:  Negative for headaches.   Psychiatric/Behavioral:  Negative for confusion.      Physical Exam     Initial Vitals [10/17/22 0710]   BP Pulse Resp Temp SpO2   (!) 142/78 104 20 98.3 °F (36.8 °C) 98 %      MAP       --         Physical Exam    Nursing note and vitals reviewed.  HENT:   Head: Atraumatic.   Mouth/Throat: Oropharynx is clear and moist.   Eyes: Conjunctivae and EOM are normal.    Neck:   Normal range of motion.  Cardiovascular:      Exam reveals no gallop and no friction rub.       No murmur heard.  Pulmonary/Chest: Breath sounds normal. No respiratory distress.   Abdominal: Abdomen is soft. There is no abdominal tenderness.   Musculoskeletal:         General: Normal range of motion.      Cervical back: Normal range of motion.     Neurological: She is alert and oriented to person, place, and time.   Psychiatric: She has a normal mood and affect.       ED Course   Procedures  Labs Reviewed   INFLUENZA A & B BY MOLECULAR - Abnormal; Notable for the following components:       Result Value    Influenza B, Molecular Positive (*)     All other components within normal limits   GROUP A STREP, MOLECULAR   SARS-COV-2 RNA AMPLIFICATION, QUAL    Narrative:     Is the patient symptomatic?->Yes          Imaging Results    None          Medications - No data to display  Medical Decision Making:   Initial Assessment:   32-year-old female with URI symptoms for 1 week.  Well-appearing on exam.  Neck is supple.  Oropharynx clear.  Daughter has similar symptoms.  Vital signs unremarkable.  Flu B +.  Plan for supportive care and OBGyn follow up                        Clinical Impression:   Final diagnoses:  [J10.1] Influenza B (Primary)      ED Disposition Condition    Discharge Stable          ED Prescriptions    None       Follow-up Information       Follow up With Specialties Details Why Contact Info    Obgyn  Schedule an appointment as soon as possible for a visit in 2 days               Kwame Kraft MD  10/17/22 0402

## 2024-05-13 ENCOUNTER — OFFICE VISIT (OUTPATIENT)
Dept: PRIMARY CARE CLINIC | Facility: CLINIC | Age: 34
End: 2024-05-13
Payer: MEDICAID

## 2024-05-13 VITALS
OXYGEN SATURATION: 98 % | DIASTOLIC BLOOD PRESSURE: 76 MMHG | WEIGHT: 243 LBS | SYSTOLIC BLOOD PRESSURE: 100 MMHG | HEART RATE: 78 BPM | TEMPERATURE: 98 F | BODY MASS INDEX: 40.48 KG/M2 | HEIGHT: 65 IN

## 2024-05-13 DIAGNOSIS — K82.9 GALLBLADDER ATTACK: ICD-10-CM

## 2024-05-13 DIAGNOSIS — E66.01 MORBID OBESITY: ICD-10-CM

## 2024-05-13 DIAGNOSIS — Z11.3 SCREEN FOR STD (SEXUALLY TRANSMITTED DISEASE): ICD-10-CM

## 2024-05-13 DIAGNOSIS — Z13.1 ENCOUNTER FOR SCREENING FOR DIABETES MELLITUS: ICD-10-CM

## 2024-05-13 DIAGNOSIS — Z00.01 ENCOUNTER FOR GENERAL ADULT MEDICAL EXAMINATION WITH ABNORMAL FINDINGS: Primary | ICD-10-CM

## 2024-05-13 PROCEDURE — 3074F SYST BP LT 130 MM HG: CPT | Mod: CPTII,,, | Performed by: FAMILY MEDICINE

## 2024-05-13 PROCEDURE — 99214 OFFICE O/P EST MOD 30 MIN: CPT | Mod: PBBFAC,PN | Performed by: FAMILY MEDICINE

## 2024-05-13 PROCEDURE — 1160F RVW MEDS BY RX/DR IN RCRD: CPT | Mod: CPTII,,, | Performed by: FAMILY MEDICINE

## 2024-05-13 PROCEDURE — 1159F MED LIST DOCD IN RCRD: CPT | Mod: CPTII,,, | Performed by: FAMILY MEDICINE

## 2024-05-13 PROCEDURE — 3008F BODY MASS INDEX DOCD: CPT | Mod: CPTII,,, | Performed by: FAMILY MEDICINE

## 2024-05-13 PROCEDURE — 99385 PREV VISIT NEW AGE 18-39: CPT | Mod: S$PBB,,, | Performed by: FAMILY MEDICINE

## 2024-05-13 PROCEDURE — 99999 PR PBB SHADOW E&M-EST. PATIENT-LVL IV: CPT | Mod: PBBFAC,,, | Performed by: FAMILY MEDICINE

## 2024-05-13 PROCEDURE — 3078F DIAST BP <80 MM HG: CPT | Mod: CPTII,,, | Performed by: FAMILY MEDICINE

## 2024-05-14 NOTE — PROGRESS NOTES
Subjective:       Patient ID: Nadia Mary is a 34 y.o. female.    Chief Complaint: Establish Care, Annual Exam, Obesity, and Abdominal Pain      History of Present Illness:   Nadia Mary 34 y.o. female presents today with Establish Care, Annual Exam, Obesity, and Abdominal Pain    Nadia Mary's allergies, medications, history, and problem list were updated as appropriate.   Past Medical History:   Diagnosis Date    Breast disorder      Family History   Problem Relation Name Age of Onset    Diabetes Mother      Breast cancer Mother      Diabetes Maternal Grandmother      Diabetes Maternal Grandfather      Diabetes Paternal Grandmother       Social History     Socioeconomic History    Marital status: Single   Tobacco Use    Smoking status: Never    Smokeless tobacco: Never   Substance and Sexual Activity    Alcohol use: No    Drug use: No    Sexual activity: Yes     Partners: Male     Birth control/protection: None     Social Determinants of Health     Financial Resource Strain: Low Risk  (6/27/2022)    Received from [facility]    Overall Financial Resource Strain (CARDIA)     Difficulty of Paying Living Expenses: Not very hard   Food Insecurity: No Food Insecurity (1/24/2023)    Received from [facility]    Hunger Vital Sign     Worried About Running Out of Food in the Last Year: Never true     Ran Out of Food in the Last Year: Never true   Transportation Needs: No Transportation Needs (1/24/2023)    Received from [facility]    PRAPARE - Transportation     Lack of Transportation (Medical): No     Lack of Transportation (Non-Medical): No   Stress: No Stress Concern Present (6/27/2022)    Received from [facility]    [employer] - Occupational Stress Questionnaire     Feeling of Stress : Not at all     Outpatient Encounter Medications as of 5/13/2024   Medication Sig Dispense Refill    [DISCONTINUED] prenatal vit/iron fum/folic ac (PRENATAL 1+1 ORAL) Take by mouth. (Patient  "not taking: Reported on 5/13/2024)      [DISCONTINUED] traMADol (ULTRAM) 50 mg tablet Take 1 tablet (50 mg total) by mouth every 8 (eight) hours as needed for Pain. (Patient not taking: Reported on 5/13/2024) 9 tablet 0     No facility-administered encounter medications on file as of 5/13/2024.       Review of Systems   Constitutional:  Negative for chills and fever.   HENT:  Negative for congestion and facial swelling.    Eyes:  Negative for discharge and itching.   Respiratory:  Negative for cough and wheezing.    Cardiovascular:  Negative for chest pain and palpitations.   Gastrointestinal:  Negative for abdominal pain, nausea and vomiting.   Endocrine: Negative for cold intolerance and heat intolerance.   Genitourinary:  Negative for dysuria and flank pain.   Musculoskeletal:  Negative for myalgias and neck stiffness.   Skin:  Negative for pallor and wound.   Neurological:  Negative for facial asymmetry and weakness.   Psychiatric/Behavioral:  Negative for agitation and suicidal ideas.        Objective:      /76 (BP Location: Right arm, Patient Position: Sitting, BP Method: Large (Manual))   Pulse 78   Temp 98.1 °F (36.7 °C)   Ht 5' 5" (1.651 m)   Wt 110.2 kg (243 lb)   LMP 05/07/2024 (Approximate)   SpO2 98%   BMI 40.44 kg/m²   Physical Exam  Vitals and nursing note reviewed.   Constitutional:       General: She is not in acute distress.     Appearance: She is well-developed.   HENT:      Head: Normocephalic and atraumatic.      Right Ear: External ear normal.      Left Ear: External ear normal.   Eyes:      Conjunctiva/sclera: Conjunctivae normal.   Neck:      Thyroid: No thyromegaly.   Cardiovascular:      Rate and Rhythm: Normal rate and regular rhythm.      Pulses: Normal pulses.      Heart sounds: Normal heart sounds. No murmur heard.  Pulmonary:      Effort: Pulmonary effort is normal. No respiratory distress.      Breath sounds: Normal breath sounds.   Abdominal:      General: Abdomen is " flat. Bowel sounds are normal. There is no distension.      Tenderness: There is no abdominal tenderness.   Genitourinary:     Comments: deferred  Musculoskeletal:         General: No deformity.      Cervical back: Neck supple.   Lymphadenopathy:      Head:      Right side of head: No submandibular adenopathy.      Left side of head: No submandibular adenopathy.      Cervical: No cervical adenopathy.   Skin:     General: Skin is warm and dry.   Neurological:      Mental Status: She is alert and oriented to person, place, and time.   Psychiatric:         Behavior: Behavior normal.         Results for orders placed or performed during the hospital encounter of 10/17/22   Influenza A & B by Molecular    Specimen: Nasopharyngeal Swab   Result Value Ref Range    Influenza A, Molecular Negative Negative    Influenza B, Molecular Positive (A) Negative    Flu A & B Source Nasal swab    Group A Strep, Molecular    Specimen: Throat   Result Value Ref Range    Group A Strep, Molecular Negative Negative   COVID-19 Rapid Screening   Result Value Ref Range    SARS-CoV-2 RNA, Amplification, Qual Negative Negative     Assessment:       1. Encounter for general adult medical examination with abnormal findings    2. Encounter for screening for diabetes mellitus    3. Morbid obesity    4. Screen for STD (sexually transmitted disease)    5. BMI 40.0-44.9, adult    6. Gallbladder attack        Plan:   1. Encounter for general adult medical examination with abnormal findings  Comments:  -frequent gallbladder attacks and obesity  Orders:  -     Lipid Panel; Future; Expected date: 05/13/2024    2. Encounter for screening for diabetes mellitus  -     Comprehensive Metabolic Panel; Future; Expected date: 05/13/2024  -     CBC Auto Differential; Future; Expected date: 05/13/2024  -     Hemoglobin A1C; Future; Expected date: 05/13/2024    3. Morbid obesity  Overview:  Reports that she is not able to loose weight despite diff with oral intake  due to gallbladder attacks, she has restricted the kind of foods to healthy choices and has started exercising in the past 9 mons without any loss in weight. Interested in weight loss solution including medication.    Weight loss is a comprehensive lifestyle intervention: a combination of diet, exercise, and behavioral modification.  Currently, requesting surgery-advised to focus on eating healthy, walk for 30-35 mins 5x a week and RTC after surgery for further eval.    Denies cardiac sxs-addipex for 3 mons will be considered in the future.      Orders:  -     TSH; Future; Expected date: 05/13/2024    4. Screen for STD (sexually transmitted disease)  -     HIV 1/2 Ag/Ab (4th Gen); Future; Expected date: 05/13/2024  -     C. trachomatis/N. gonorrhoeae by AMP DNA; Future; Expected date: 05/13/2024  -     Hepatitis C Antibody; Future; Expected date: 05/13/2024  -     Hepatitis B Surface Antigen; Future; Expected date: 05/13/2024  -     Treponema Pallidium Antibodies IgG, IgM; Future; Expected date: 05/13/2024    5. BMI 40.0-44.9, adult  -     TSH; Future; Expected date: 05/13/2024    6. Gallbladder attack  Overview:  X 4 years. With RUQ abd pain. Reports that she was diagnosed with gall bladder attacks after the birth of her son and surgery was recommended but she was not ready. Now with worsening frequency and it's triggered by all sorts of foods. Interested in cholecystectomy.    Orders:  -     Ambulatory referral/consult to General Surgery; Future; Expected date: 05/20/2024        I have reviewed all of the patient's clinical history available in care everywhere and Epic and have utilized this in my evaluation and management recommendations today.      Treatment options and alternatives were discussed with the patient. Patient was given ample time to ask questions. All questions were answered. Voices understanding and acceptance of this advice. Will call back if any further questions or concerns.     Portions of the  record may have been created with voice recognition software. Occasional wrong-word or sound-a-like substitutions may have occurred due to the inherent limitations of voice recognition software. Read the chart carefully and recognize, using context, where substitutions have occurred.               Yun Hickman MD  [facility], [place]

## 2024-06-21 ENCOUNTER — TELEPHONE (OUTPATIENT)
Dept: PRIMARY CARE CLINIC | Facility: CLINIC | Age: 34
End: 2024-06-21
Payer: MEDICAID

## 2024-06-21 NOTE — TELEPHONE ENCOUNTER
----- Message from Betty Saldaña sent at 6/21/2024  2:01 PM CDT -----  Contact: Pt [phone]  [email or web address]     Patient is calling for Medical Advice regarding: Gallbladder Removal    How long has patient had these symptoms:    Pharmacy name and phone#:    Patient wants a call back or thru [facility]:Call back    Comments:  Patient is Following up with you from her last visit on 5/13/24, where you discussed her having her Gallbladder removed.  Patient states she has not heard from you concerning the surgery date.    Please advise patient replies from provider may take up to 48 hours.    Please call and advise.    Thank You

## 2024-06-21 NOTE — TELEPHONE ENCOUNTER
Returned call to patient in regards to message to inform referral was placed to General Surgery on 5/13/24. Provided contact for scheduling for status of referral and to schedule appt. She voiced understanding

## 2024-10-14 ENCOUNTER — OFFICE VISIT (OUTPATIENT)
Dept: SURGERY | Facility: CLINIC | Age: 34
End: 2024-10-14
Payer: MEDICAID

## 2024-10-14 ENCOUNTER — HOSPITAL ENCOUNTER (OUTPATIENT)
Dept: RADIOLOGY | Facility: HOSPITAL | Age: 34
Discharge: HOME | End: 2024-10-14
Attending: SURGERY
Payer: MEDICAID

## 2024-10-14 VITALS
WEIGHT: 238.56 LBS | HEIGHT: 65 IN | TEMPERATURE: 98 F | HEART RATE: 63 BPM | DIASTOLIC BLOOD PRESSURE: 73 MMHG | SYSTOLIC BLOOD PRESSURE: 128 MMHG | BODY MASS INDEX: 39.75 KG/M2

## 2024-10-14 DIAGNOSIS — K82.9 GALLBLADDER ATTACK: Primary | ICD-10-CM

## 2024-10-14 DIAGNOSIS — K82.9 GALLBLADDER ATTACK: ICD-10-CM

## 2024-10-14 PROCEDURE — 99999 PR PBB SHADOW E&M-EST. PATIENT-LVL III: CPT | Mod: PBBFAC,,, | Performed by: SURGERY

## 2024-10-14 PROCEDURE — 76705 ECHO EXAM OF ABDOMEN: CPT | Mod: 26,,, | Performed by: RADIOLOGY

## 2024-10-14 PROCEDURE — 99213 OFFICE O/P EST LOW 20 MIN: CPT | Mod: PBBFAC,25 | Performed by: SURGERY

## 2024-10-14 PROCEDURE — 99203 OFFICE O/P NEW LOW 30 MIN: CPT | Mod: S$PBB,,, | Performed by: SURGERY

## 2024-10-14 PROCEDURE — 1159F MED LIST DOCD IN RCRD: CPT | Mod: CPTII,,, | Performed by: SURGERY

## 2024-10-14 PROCEDURE — 3074F SYST BP LT 130 MM HG: CPT | Mod: CPTII,,, | Performed by: SURGERY

## 2024-10-14 PROCEDURE — 76705 ECHO EXAM OF ABDOMEN: CPT | Mod: TC,PN

## 2024-10-14 PROCEDURE — 3008F BODY MASS INDEX DOCD: CPT | Mod: CPTII,,, | Performed by: SURGERY

## 2024-10-14 PROCEDURE — 3078F DIAST BP <80 MM HG: CPT | Mod: CPTII,,, | Performed by: SURGERY

## 2024-10-14 NOTE — PROGRESS NOTES
"History & Physical    Subjective     History of Present Illness:  Patient is a 34 y.o. female referred for gallbladder attack.  She reports having intermittent epigastric abdominal pain worse after eating.  This began approximately 5 years ago and persisted during her previous pregnancy.  It was intermittent but now coming on every day.    No chief complaint on file.      Review of patient's allergies indicates:   Allergen Reactions    Pecan nut Other (See Comments)       No current outpatient medications on file.     No current facility-administered medications for this visit.       Past Medical History:   Diagnosis Date    Breast disorder      Past Surgical History:   Procedure Laterality Date    BREAST SURGERY       SECTION      HERNIA REPAIR      TONSILLECTOMY      5th Grade     Family History   Problem Relation Name Age of Onset    Diabetes Mother      Breast cancer Mother      Diabetes Maternal Grandmother      Diabetes Maternal Grandfather      Diabetes Paternal Grandmother       Social History     Tobacco Use    Smoking status: Never    Smokeless tobacco: Never   Substance Use Topics    Alcohol use: No    Drug use: No        Review of Systems:  Review of Systems   Constitutional:  Negative for chills, fatigue, fever and unexpected weight change.   Respiratory:  Negative for cough, shortness of breath, wheezing and stridor.    Cardiovascular:  Negative for chest pain, palpitations and leg swelling.   Gastrointestinal:  Positive for abdominal pain and nausea. Negative for abdominal distention, constipation, diarrhea and vomiting.   Genitourinary:  Negative for difficulty urinating, dysuria, frequency, hematuria and urgency.   Skin:  Negative for color change, pallor, rash and wound.   Hematological:  Does not bruise/bleed easily.          Objective     Vital Signs (Most Recent)  Temp: 97.8 °F (36.6 °C) (10/14/24 1142)  Pulse: 63 (10/14/24 1142)  BP: 128/73 (10/14/24 1142)  5' 5" (1.651 m)  108.2 kg " (238 lb 8.6 oz)     Physical Exam:  Physical Exam  Vitals reviewed.   Constitutional:       Appearance: She is well-developed.   HENT:      Head: Normocephalic and atraumatic.   Cardiovascular:      Rate and Rhythm: Normal rate.   Pulmonary:      Effort: Pulmonary effort is normal.   Abdominal:      General: There is no distension.      Palpations: Abdomen is soft.      Tenderness: There is no abdominal tenderness.      Comments: Surgical scar from previous umbilical hernia repair   Musculoskeletal:      Cervical back: Neck supple.   Skin:     General: Skin is warm and dry.   Neurological:      Mental Status: She is alert and oriented to person, place, and time.         Diagnostic Results:  CT abdomen/pelvis 2023:   ABDOMEN:   Liver: Within normal limits   Gallbladder: Within normal limits.   Biliary tract: Within normal limits.      Assessment and Plan     34-year-old female with chronic epigastric postprandial pain    PLAN:    Reviewed patient's symptoms.  Discussed potential biliary etiology versus gastrointestinal causes  Ultrasound gallbladder for further evaluation   We also discussed potential role for HIDA scan and further workup should the ultrasound be negative.  If the gallbladder is found to have stones or sludge can proceed with cholecystectomy.

## 2024-10-16 ENCOUNTER — PATIENT MESSAGE (OUTPATIENT)
Dept: SURGERY | Facility: CLINIC | Age: 34
End: 2024-10-16
Payer: MEDICAID

## 2024-10-16 DIAGNOSIS — K80.20 SYMPTOMATIC CHOLELITHIASIS: Primary | ICD-10-CM

## 2024-10-16 DIAGNOSIS — K82.9 GALLBLADDER ATTACK: ICD-10-CM

## 2024-10-16 RX ORDER — CEFAZOLIN SODIUM 2 G/50ML
2 SOLUTION INTRAVENOUS
OUTPATIENT
Start: 2024-10-16

## 2024-10-16 RX ORDER — SODIUM CHLORIDE 9 MG/ML
INJECTION, SOLUTION INTRAVENOUS CONTINUOUS
OUTPATIENT
Start: 2024-10-16

## 2024-10-16 RX ORDER — LIDOCAINE HYDROCHLORIDE 10 MG/ML
1 INJECTION, SOLUTION EPIDURAL; INFILTRATION; INTRACAUDAL; PERINEURAL ONCE
OUTPATIENT
Start: 2024-10-16 | End: 2024-10-16

## 2024-10-17 ENCOUNTER — TELEPHONE (OUTPATIENT)
Dept: SURGERY | Facility: CLINIC | Age: 34
End: 2024-10-17
Payer: MEDICAID

## 2024-10-25 ENCOUNTER — LAB VISIT (OUTPATIENT)
Dept: LAB | Facility: HOSPITAL | Age: 34
End: 2024-10-25
Attending: SURGERY
Payer: MEDICAID

## 2024-10-25 DIAGNOSIS — K82.9 GALLBLADDER ATTACK: ICD-10-CM

## 2024-10-25 DIAGNOSIS — K80.20 SYMPTOMATIC CHOLELITHIASIS: ICD-10-CM

## 2024-10-25 LAB
BASOPHILS # BLD AUTO: 0.02 K/UL (ref 0–0.2)
BASOPHILS NFR BLD: 0.2 % (ref 0–1.9)
DIFFERENTIAL METHOD BLD: ABNORMAL
EOSINOPHIL # BLD AUTO: 0.1 K/UL (ref 0–0.5)
EOSINOPHIL NFR BLD: 1.1 % (ref 0–8)
ERYTHROCYTE [DISTWIDTH] IN BLOOD BY AUTOMATED COUNT: 14.5 % (ref 11.5–14.5)
HCT VFR BLD AUTO: 37.4 % (ref 37–48.5)
HGB BLD-MCNC: 12 G/DL (ref 12–16)
IMM GRANULOCYTES # BLD AUTO: 0.01 K/UL (ref 0–0.04)
IMM GRANULOCYTES NFR BLD AUTO: 0.1 % (ref 0–0.5)
LYMPHOCYTES # BLD AUTO: 3.7 K/UL (ref 1–4.8)
LYMPHOCYTES NFR BLD: 46.5 % (ref 18–48)
MCH RBC QN AUTO: 27.5 PG (ref 27–31)
MCHC RBC AUTO-ENTMCNC: 32.1 G/DL (ref 32–36)
MCV RBC AUTO: 86 FL (ref 82–98)
MONOCYTES # BLD AUTO: 0.4 K/UL (ref 0.3–1)
MONOCYTES NFR BLD: 5.3 % (ref 4–15)
NEUTROPHILS # BLD AUTO: 3.8 K/UL (ref 1.8–7.7)
NEUTROPHILS NFR BLD: 46.8 % (ref 38–73)
NRBC BLD-RTO: 0 /100 WBC
PLATELET # BLD AUTO: 452 K/UL (ref 150–450)
PMV BLD AUTO: 10.5 FL (ref 9.2–12.9)
RBC # BLD AUTO: 4.37 M/UL (ref 4–5.4)
WBC # BLD AUTO: 8.05 K/UL (ref 3.9–12.7)

## 2024-10-25 PROCEDURE — 36415 COLL VENOUS BLD VENIPUNCTURE: CPT | Mod: PN | Performed by: SURGERY

## 2024-10-25 PROCEDURE — 85025 COMPLETE CBC W/AUTO DIFF WBC: CPT | Performed by: SURGERY

## 2024-10-25 PROCEDURE — 80053 COMPREHEN METABOLIC PANEL: CPT | Performed by: SURGERY

## 2024-10-26 LAB
ALBUMIN SERPL BCP-MCNC: 3.7 G/DL (ref 3.5–5.2)
ALP SERPL-CCNC: 84 U/L (ref 40–150)
ALT SERPL W/O P-5'-P-CCNC: 11 U/L (ref 10–44)
ANION GAP SERPL CALC-SCNC: 13 MMOL/L (ref 8–16)
AST SERPL-CCNC: 20 U/L (ref 10–40)
BILIRUB SERPL-MCNC: 0.3 MG/DL (ref 0.1–1)
BUN SERPL-MCNC: 7 MG/DL (ref 6–20)
CALCIUM SERPL-MCNC: 9.7 MG/DL (ref 8.7–10.5)
CHLORIDE SERPL-SCNC: 104 MMOL/L (ref 95–110)
CO2 SERPL-SCNC: 21 MMOL/L (ref 23–29)
CREAT SERPL-MCNC: 0.8 MG/DL (ref 0.5–1.4)
EST. GFR  (NO RACE VARIABLE): >60 ML/MIN/1.73 M^2
GLUCOSE SERPL-MCNC: 74 MG/DL (ref 70–110)
POTASSIUM SERPL-SCNC: 3.8 MMOL/L (ref 3.5–5.1)
PROT SERPL-MCNC: 7.6 G/DL (ref 6–8.4)
SODIUM SERPL-SCNC: 138 MMOL/L (ref 136–145)

## 2024-10-30 ENCOUNTER — TELEPHONE (OUTPATIENT)
Dept: PREADMISSION TESTING | Facility: HOSPITAL | Age: 34
End: 2024-10-30
Payer: MEDICAID

## 2024-10-31 ENCOUNTER — PATIENT MESSAGE (OUTPATIENT)
Dept: PREADMISSION TESTING | Facility: HOSPITAL | Age: 34
End: 2024-10-31
Payer: MEDICAID

## 2024-10-31 NOTE — PRE-PROCEDURE INSTRUCTIONS
Called and spoke with pt about the following:     Please arrive to [facility] ([facility] at 0945 am on 11/1/2024 for your scheduled procedure.  Address: [address] [place] [address] [place] [address] ([address] Building on left, 1st Floor Lobby)    !!!NO FOOD after midnight! You may have clear liquids up to 3 hrs before your arrival to the Hospital!!!  Clear liquids include Gatorade, water, soda, black coffee or tea (no milk or creamer), and clear juices.  Clear liquids do NOT include anything with pulp or food particles (Chicken broth, ice cream, yogurt, Jello, etc.)    Thank you,  -[facility] Pre [facility]mit [facility]  Mon-Fri 7:30 am - 4 pm [phone]

## 2024-11-01 ENCOUNTER — HOSPITAL ENCOUNTER (OUTPATIENT)
Facility: HOSPITAL | Age: 34
Discharge: HOME | End: 2024-11-01
Attending: SURGERY | Admitting: SURGERY
Payer: MEDICAID

## 2024-11-01 ENCOUNTER — ANESTHESIA EVENT (OUTPATIENT)
Dept: SURGERY | Facility: HOSPITAL | Age: 34
End: 2024-11-01
Payer: MEDICAID

## 2024-11-01 ENCOUNTER — ANESTHESIA (OUTPATIENT)
Dept: SURGERY | Facility: HOSPITAL | Age: 34
End: 2024-11-01
Payer: MEDICAID

## 2024-11-01 VITALS
BODY MASS INDEX: 39.65 KG/M2 | SYSTOLIC BLOOD PRESSURE: 134 MMHG | HEIGHT: 65 IN | DIASTOLIC BLOOD PRESSURE: 59 MMHG | TEMPERATURE: 98 F | HEART RATE: 86 BPM | OXYGEN SATURATION: 98 % | WEIGHT: 238 LBS | RESPIRATION RATE: 19 BRPM

## 2024-11-01 DIAGNOSIS — K80.20 SYMPTOMATIC CHOLELITHIASIS: Primary | ICD-10-CM

## 2024-11-01 DIAGNOSIS — K82.9 GALLBLADDER ATTACK: ICD-10-CM

## 2024-11-01 LAB
B-HCG UR QL: NEGATIVE
CTP QC/QA: YES

## 2024-11-01 PROCEDURE — 37000008 HC ANESTHESIA 1ST 15 MINUTES: Performed by: SURGERY

## 2024-11-01 PROCEDURE — 88304 TISSUE EXAM BY PATHOLOGIST: CPT | Performed by: PATHOLOGY

## 2024-11-01 PROCEDURE — 63600175 PHARM REV CODE 636 W HCPCS

## 2024-11-01 PROCEDURE — 81025 URINE PREGNANCY TEST: CPT | Performed by: SURGERY

## 2024-11-01 PROCEDURE — 25000003 PHARM REV CODE 250

## 2024-11-01 PROCEDURE — 63600175 PHARM REV CODE 636 W HCPCS: Mod: JZ,JG | Performed by: SURGERY

## 2024-11-01 PROCEDURE — 27201423 OPTIME MED/SURG SUP & DEVICES STERILE SUPPLY: Performed by: SURGERY

## 2024-11-01 PROCEDURE — 25000003 PHARM REV CODE 250: Performed by: SURGERY

## 2024-11-01 PROCEDURE — 71000015 HC POSTOP RECOV 1ST HR: Performed by: SURGERY

## 2024-11-01 PROCEDURE — 63600175 PHARM REV CODE 636 W HCPCS: Performed by: ANESTHESIOLOGY

## 2024-11-01 PROCEDURE — 36000711: Performed by: SURGERY

## 2024-11-01 PROCEDURE — 36000710: Performed by: SURGERY

## 2024-11-01 PROCEDURE — 47562 LAPAROSCOPIC CHOLECYSTECTOMY: CPT | Mod: ,,, | Performed by: SURGERY

## 2024-11-01 PROCEDURE — 71000033 HC RECOVERY, INTIAL HOUR: Performed by: SURGERY

## 2024-11-01 PROCEDURE — 37000009 HC ANESTHESIA EA ADD 15 MINS: Performed by: SURGERY

## 2024-11-01 PROCEDURE — 63600175 PHARM REV CODE 636 W HCPCS: Performed by: SURGERY

## 2024-11-01 RX ORDER — FENTANYL CITRATE 50 UG/ML
INJECTION, SOLUTION INTRAMUSCULAR; INTRAVENOUS
Status: DISCONTINUED | OUTPATIENT
Start: 2024-11-01 | End: 2024-11-01

## 2024-11-01 RX ORDER — ONDANSETRON HYDROCHLORIDE 2 MG/ML
4 INJECTION, SOLUTION INTRAVENOUS EVERY 12 HOURS PRN
Status: DISCONTINUED | OUTPATIENT
Start: 2024-11-01 | End: 2024-11-01 | Stop reason: HOSPADM

## 2024-11-01 RX ORDER — LIDOCAINE HYDROCHLORIDE 10 MG/ML
1 INJECTION, SOLUTION EPIDURAL; INFILTRATION; INTRACAUDAL; PERINEURAL ONCE
Status: DISCONTINUED | OUTPATIENT
Start: 2024-11-01 | End: 2024-11-01 | Stop reason: HOSPADM

## 2024-11-01 RX ORDER — ROCURONIUM BROMIDE 10 MG/ML
INJECTION, SOLUTION INTRAVENOUS
Status: DISCONTINUED | OUTPATIENT
Start: 2024-11-01 | End: 2024-11-01

## 2024-11-01 RX ORDER — DEXMEDETOMIDINE HYDROCHLORIDE 100 UG/ML
INJECTION, SOLUTION INTRAVENOUS
Status: DISCONTINUED | OUTPATIENT
Start: 2024-11-01 | End: 2024-11-01

## 2024-11-01 RX ORDER — OXYCODONE AND ACETAMINOPHEN 5; 325 MG/1; MG/1
1 TABLET ORAL
Status: DISCONTINUED | OUTPATIENT
Start: 2024-11-01 | End: 2024-11-01 | Stop reason: HOSPADM

## 2024-11-01 RX ORDER — MEPERIDINE HYDROCHLORIDE 25 MG/ML
12.5 INJECTION INTRAMUSCULAR; INTRAVENOUS; SUBCUTANEOUS ONCE
Status: COMPLETED | OUTPATIENT
Start: 2024-11-01 | End: 2024-11-01

## 2024-11-01 RX ORDER — ONDANSETRON HYDROCHLORIDE 2 MG/ML
4 INJECTION, SOLUTION INTRAVENOUS DAILY PRN
Status: DISCONTINUED | OUTPATIENT
Start: 2024-11-01 | End: 2024-11-01 | Stop reason: HOSPADM

## 2024-11-01 RX ORDER — LIDOCAINE HYDROCHLORIDE 20 MG/ML
INJECTION INTRAVENOUS
Status: DISCONTINUED | OUTPATIENT
Start: 2024-11-01 | End: 2024-11-01

## 2024-11-01 RX ORDER — SODIUM CHLORIDE 9 MG/ML
INJECTION, SOLUTION INTRAVENOUS CONTINUOUS
Status: DISCONTINUED | OUTPATIENT
Start: 2024-11-01 | End: 2024-11-01 | Stop reason: HOSPADM

## 2024-11-01 RX ORDER — HYDROCODONE BITARTRATE AND ACETAMINOPHEN 10; 325 MG/1; MG/1
1 TABLET ORAL EVERY 4 HOURS PRN
Status: DISCONTINUED | OUTPATIENT
Start: 2024-11-01 | End: 2024-11-01 | Stop reason: HOSPADM

## 2024-11-01 RX ORDER — LIDOCAINE HYDROCHLORIDE 10 MG/ML
INJECTION, SOLUTION EPIDURAL; INFILTRATION; INTRACAUDAL; PERINEURAL
Status: DISCONTINUED | OUTPATIENT
Start: 2024-11-01 | End: 2024-11-01 | Stop reason: HOSPADM

## 2024-11-01 RX ORDER — PROPOFOL 10 MG/ML
VIAL (ML) INTRAVENOUS
Status: DISCONTINUED | OUTPATIENT
Start: 2024-11-01 | End: 2024-11-01

## 2024-11-01 RX ORDER — INDOCYANINE GREEN AND WATER 25 MG
KIT INJECTION
Status: DISCONTINUED | OUTPATIENT
Start: 2024-11-01 | End: 2024-11-01

## 2024-11-01 RX ORDER — BUPIVACAINE HYDROCHLORIDE 2.5 MG/ML
INJECTION, SOLUTION EPIDURAL; INFILTRATION; INTRACAUDAL
Status: DISCONTINUED | OUTPATIENT
Start: 2024-11-01 | End: 2024-11-01 | Stop reason: HOSPADM

## 2024-11-01 RX ORDER — IBUPROFEN 800 MG/1
800 TABLET ORAL 3 TIMES DAILY PRN
Qty: 30 TABLET | Refills: 0 | Status: SHIPPED | OUTPATIENT
Start: 2024-11-01

## 2024-11-01 RX ORDER — HYDROCODONE BITARTRATE AND ACETAMINOPHEN 10; 325 MG/1; MG/1
1 TABLET ORAL EVERY 6 HOURS PRN
Qty: 15 TABLET | Refills: 0 | Status: SHIPPED | OUTPATIENT
Start: 2024-11-01

## 2024-11-01 RX ORDER — HYDROMORPHONE HYDROCHLORIDE 1 MG/ML
0.2 INJECTION, SOLUTION INTRAMUSCULAR; INTRAVENOUS; SUBCUTANEOUS EVERY 5 MIN PRN
Status: DISCONTINUED | OUTPATIENT
Start: 2024-11-01 | End: 2024-11-01 | Stop reason: HOSPADM

## 2024-11-01 RX ORDER — ONDANSETRON HYDROCHLORIDE 2 MG/ML
INJECTION, SOLUTION INTRAVENOUS
Status: DISCONTINUED | OUTPATIENT
Start: 2024-11-01 | End: 2024-11-01

## 2024-11-01 RX ORDER — HYDROCODONE BITARTRATE AND ACETAMINOPHEN 5; 325 MG/1; MG/1
1 TABLET ORAL EVERY 4 HOURS PRN
Status: DISCONTINUED | OUTPATIENT
Start: 2024-11-01 | End: 2024-11-01 | Stop reason: HOSPADM

## 2024-11-01 RX ORDER — DEXAMETHASONE SODIUM PHOSPHATE 4 MG/ML
INJECTION, SOLUTION INTRA-ARTICULAR; INTRALESIONAL; INTRAMUSCULAR; INTRAVENOUS; SOFT TISSUE
Status: DISCONTINUED | OUTPATIENT
Start: 2024-11-01 | End: 2024-11-01

## 2024-11-01 RX ORDER — KETOROLAC TROMETHAMINE 30 MG/ML
15 INJECTION, SOLUTION INTRAMUSCULAR; INTRAVENOUS EVERY 8 HOURS PRN
Status: DISCONTINUED | OUTPATIENT
Start: 2024-11-01 | End: 2024-11-01 | Stop reason: HOSPADM

## 2024-11-01 RX ORDER — MIDAZOLAM HYDROCHLORIDE 1 MG/ML
INJECTION INTRAMUSCULAR; INTRAVENOUS
Status: DISCONTINUED | OUTPATIENT
Start: 2024-11-01 | End: 2024-11-01

## 2024-11-01 RX ORDER — CEFAZOLIN 2 G/1
2 INJECTION, POWDER, FOR SOLUTION INTRAMUSCULAR; INTRAVENOUS
Status: COMPLETED | OUTPATIENT
Start: 2024-11-01 | End: 2024-11-01

## 2024-11-01 RX ADMIN — INDOCYANINE GREEN AND WATER 2.5 MG: KIT at 11:11

## 2024-11-01 RX ADMIN — FENTANYL CITRATE 50 MCG: 50 INJECTION, SOLUTION INTRAMUSCULAR; INTRAVENOUS at 01:11

## 2024-11-01 RX ADMIN — ONDANSETRON 8 MG: 2 INJECTION INTRAMUSCULAR; INTRAVENOUS at 11:11

## 2024-11-01 RX ADMIN — SUGAMMADEX 200 MG: 100 INJECTION, SOLUTION INTRAVENOUS at 01:11

## 2024-11-01 RX ADMIN — MIDAZOLAM HYDROCHLORIDE 2 MG: 1 INJECTION, SOLUTION INTRAMUSCULAR; INTRAVENOUS at 11:11

## 2024-11-01 RX ADMIN — HYDROMORPHONE HYDROCHLORIDE 0.2 MG: 1 INJECTION, SOLUTION INTRAMUSCULAR; INTRAVENOUS; SUBCUTANEOUS at 01:11

## 2024-11-01 RX ADMIN — SODIUM CHLORIDE, POTASSIUM CHLORIDE, SODIUM LACTATE AND CALCIUM CHLORIDE: 600; 310; 30; 20 INJECTION, SOLUTION INTRAVENOUS at 11:11

## 2024-11-01 RX ADMIN — KETOROLAC TROMETHAMINE 15 MG: 30 INJECTION, SOLUTION INTRAMUSCULAR at 01:11

## 2024-11-01 RX ADMIN — ROCURONIUM BROMIDE 50 MG: 10 INJECTION, SOLUTION INTRAVENOUS at 11:11

## 2024-11-01 RX ADMIN — LIDOCAINE HYDROCHLORIDE 100 MG: 20 INJECTION INTRAVENOUS at 11:11

## 2024-11-01 RX ADMIN — CEFAZOLIN 2 G: 2 INJECTION, POWDER, FOR SOLUTION INTRAMUSCULAR; INTRAVENOUS at 11:11

## 2024-11-01 RX ADMIN — DEXMEDETOMIDINE 2 MCG: 200 INJECTION, SOLUTION INTRAVENOUS at 12:11

## 2024-11-01 RX ADMIN — MEPERIDINE HYDROCHLORIDE 12.5 MG: 25 INJECTION INTRAMUSCULAR; INTRAVENOUS; SUBCUTANEOUS at 01:11

## 2024-11-01 RX ADMIN — FENTANYL CITRATE 25 MCG: 50 INJECTION, SOLUTION INTRAMUSCULAR; INTRAVENOUS at 12:11

## 2024-11-01 RX ADMIN — PROPOFOL 150 MG: 10 INJECTION, EMULSION INTRAVENOUS at 11:11

## 2024-11-01 RX ADMIN — FENTANYL CITRATE 100 MCG: 50 INJECTION, SOLUTION INTRAMUSCULAR; INTRAVENOUS at 11:11

## 2024-11-01 RX ADMIN — DEXAMETHASONE SODIUM PHOSPHATE 8 MG: 4 INJECTION, SOLUTION INTRA-ARTICULAR; INTRALESIONAL; INTRAMUSCULAR; INTRAVENOUS; SOFT TISSUE at 11:11

## 2024-11-01 RX ADMIN — HYDROCODONE BITARTRATE AND ACETAMINOPHEN 1 TABLET: 10; 325 TABLET ORAL at 01:11

## 2024-11-01 RX ADMIN — DEXMEDETOMIDINE 4 MCG: 200 INJECTION, SOLUTION INTRAVENOUS at 12:11

## 2024-11-01 RX ADMIN — PROPOFOL 20 MG: 10 INJECTION, EMULSION INTRAVENOUS at 12:11

## 2024-11-01 NOTE — DISCHARGE SUMMARY
[facility] - Surgery (Hospital)  Discharge Note  Short Stay    Procedure(s) (LRB):  XI ROBOTIC CHOLECYSTECTOMY (N/A)      OUTCOME: Patient tolerated treatment/procedure well without complication and is now ready for discharge.    DISPOSITION: Home or Self Care    FINAL DIAGNOSIS:  Symptomatic cholelithiasis    FOLLOWUP: In clinic    DISCHARGE INSTRUCTIONS:    Discharge Procedure Orders   Diet general     Call MD for:  temperature >100.4     Call MD for:  persistent nausea and vomiting     Call MD for:  severe uncontrolled pain     Call MD for:  difficulty breathing, headache or visual disturbances     Call MD for:  redness, tenderness, or signs of infection (pain, swelling, redness, odor or green/yellow discharge around incision site)     Call MD for:  hives     Call MD for:  persistent dizziness or light-headedness     Call MD for:  extreme fatigue     No dressing needed     Activity as tolerated     Shower on day dressing removed (No bath)   Order Comments: Okay to shower in 48 hours        TIME SPENT ON DISCHARGE: 30 minutes

## 2024-11-01 NOTE — OP NOTE
[facility] - [facility] ([facility])  Surgery Department  Operative Note    SUMMARY     Date of Procedure: 11/1/2024     Procedure:   Laparoscopic lysis of adhesions   Robotic cholecystectomy    Surgeons and Role:     * Quan Aranda MD - Primary    Assisting Surgeon: None    Pre-Operative Diagnosis: Gallbladder attack [K82.9]  Symptomatic cholelithiasis [K80.20]    Post-Operative Diagnosis: Post-Op Diagnosis Codes:     * Gallbladder attack [K82.9]     * Symptomatic cholelithiasis [K80.20]    Anesthesia: General    Operative Findings (including complications, if any): Laparoscopic lysis of adhesions   Robotic cholecystectomy    Description of Technical Procedures:  Omental adhesions to abdominal wall at the site of prior hernia repair Gallstones      Estimated Blood Loss (EBL): 10 mL           Implants: * No implants in log *    Specimens:   Specimen (24h ago, onward)       Start     Ordered    11/01/24 1224  Specimen to Pathology, Surgery General Surgery  Once        Comments: Pre-op Diagnosis: Gallbladder attack [K82.9]Symptomatic cholelithiasis [K80.20]Procedure(s):XI ROBOTIC CHOLECYSTECTOMY Number of specimens: 1Name of specimens: 1. Gallbladder with stones-perm     References:    Click here for ordering Quick Tip   Question Answer Comment   Procedure Type: General Surgery    Specimen Class: Routine/Screening    Release to patient Immediate        11/01/24 1258                            Condition: Good    Disposition: PACU - hemodynamically stable.    Procedure in detail:   The patient was brought to the OR and underwent general anesthesia. The abdomen was prepped and draped in usual sterile fashion.  An incision was made just above the umbilicus.  Fascia grasped and elevated.  A Veress needle was inserted and insufflation obtained to 15 mm Hg.  An 8 mm robotic Optiview port was placed in the left lateral site. The laparoscope was inserted. There was no evidence of a vascular or enteric injury.    Omental adhesions to  the abdominal wall were noted.     Additional trocars were placed as follows under visualization. An 8 mm trocar was placed in the anterior axillary line of the right mid abdomen. An 8 mm trocar was placed in the midclavicular line of the right midabdomen.     The omental adhesions to the abdominal wall were lysed to facilitate further trocar placement.  Once this was complete and epigastric incision was made and 8 mm robotic trocar placed at this site.     The patient was placed in reverse Trendelenburg position and rolled to the left. The patient was docked to the Focus IPi robot.         The fundus of the gallbladder was retracted cephalad. The gallbladder infundibulum was retracted laterally.      Through meticulous dissection the cystic duct was dissected circumferentially. The cystic artery was dissected circumferentially and the neck of the gallbladder was then dissected off the gallbladder bed. This cleared Calots triangle of all loose areolar tissue and the critical view was obtained.     Indocyanine green with firefly technology was used to elucidate the course of the cystic duct and biliary anatomy.      The cystic artery was clipped with 2 clips proximally 1 clip distally and transected.  The cystic duct was clipped twice proximally and once distally and divided.     The gallbladder was dissected free from the gallbladder bed.    The gallbladder bed was inspected and hemostasis was ensured using hook electrocautery. The area was irrigated until clear.     The right lateral operating arm of the robot was then removed and an Endo Catch bag was inserted. The gallbladder was placed in Endo Catch bag. The patient was then undocked from the da Trudy operating robot. The gallbladder was extracted.     The right lateral port site fascia was closed with 0 Vicryl sutures.     The trocars were removed under direct vision and no bleeding was noted. The abdomen was then desufflated. Marcaine was infiltrated. All  incisions were closed with 4-0 Monocryl in a subcuticular manner. Dermaflex was applied to the incision sites

## 2024-11-07 LAB
FINAL PATHOLOGIC DIAGNOSIS: NORMAL
GROSS: NORMAL
Lab: NORMAL

## 2024-11-25 ENCOUNTER — OFFICE VISIT (OUTPATIENT)
Dept: SURGERY | Facility: CLINIC | Age: 34
End: 2024-11-25
Payer: MEDICAID

## 2024-11-25 VITALS — BODY MASS INDEX: 39.62 KG/M2 | WEIGHT: 238.13 LBS

## 2024-11-25 DIAGNOSIS — K82.9 GALLBLADDER ATTACK: Primary | ICD-10-CM

## 2024-11-25 DIAGNOSIS — K80.20 SYMPTOMATIC CHOLELITHIASIS: ICD-10-CM

## 2024-11-25 PROCEDURE — 99213 OFFICE O/P EST LOW 20 MIN: CPT | Mod: PBBFAC

## 2024-11-25 PROCEDURE — 1159F MED LIST DOCD IN RCRD: CPT | Mod: CPTII,,,

## 2024-11-25 PROCEDURE — 1160F RVW MEDS BY RX/DR IN RCRD: CPT | Mod: CPTII,,,

## 2024-11-25 PROCEDURE — 99999 PR PBB SHADOW E&M-EST. PATIENT-LVL III: CPT | Mod: PBBFAC,,,

## 2024-11-25 PROCEDURE — 99024 POSTOP FOLLOW-UP VISIT: CPT | Mod: ,,,

## 2024-11-25 NOTE — PROGRESS NOTES
History & Physical    Subjective     History of Present Illness:  Patient is a 34 y.o. female referred for gallbladder attack.  She reports having intermittent epigastric abdominal pain worse after eating.  This began approximately 5 years ago and persisted during her previous pregnancy.  It was intermittent but now coming on every day.    Interval History:  The last clinic visit, the patient underwent robotic cholecystectomy.  She is feeling much better than preoperatively.  She is still having some soreness around the incisions that is worse after being active throughout the day.  She is tolerating a regular diet and having normal bowel movements.  She did have 1 episode of urgency after eating meat and has avoided this since.  She denies any fevers, chills, nausea, or vomiting.    Chief Complaint   Patient presents with    Post-op Evaluation     S/p cholecystectomy        Review of patient's allergies indicates:   Allergen Reactions    Pecan nut Other (See Comments)       Current Outpatient Medications   Medication Sig Dispense Refill    HYDROcodone-acetaminophen (NORCO)  mg per tablet Take 1 tablet by mouth every 6 (six) hours as needed for Pain. (Patient not taking: Reported on 2024) 15 tablet 0    ibuprofen (ADVIL,MOTRIN) 800 MG tablet Take 1 tablet (800 mg total) by mouth 3 (three) times daily as needed. (Patient not taking: Reported on 2024) 30 tablet 0     No current facility-administered medications for this visit.       Past Medical History:   Diagnosis Date    Breast disorder      Past Surgical History:   Procedure Laterality Date    BREAST SURGERY       SECTION      HERNIA REPAIR      LAPAROSCOPIC LYSIS OF ADHESIONS N/A 2024    Procedure: LYSIS, ADHESIONS, LAPAROSCOPIC;  Surgeon: Quan Aranda MD;  Location: [facility];  Service: General;  Laterality: N/A;    ROBOT-ASSISTED CHOLECYSTECTOMY USING DA POOL XI N/A 2024    Procedure: XI ROBOTIC CHOLECYSTECTOMY;  Surgeon:  Quan Aranda MD;  Location: [facility] OR;  Service: General;  Laterality: N/A;    TONSILLECTOMY      5th Grade     Family History   Problem Relation Name Age of Onset    Diabetes Mother      Breast cancer Mother      Diabetes Maternal Grandmother      Diabetes Maternal Grandfather      Diabetes Paternal Grandmother       Social History     Tobacco Use    Smoking status: Never    Smokeless tobacco: Never   Substance Use Topics    Alcohol use: No    Drug use: No        Review of Systems:  Review of Systems   Constitutional:  Negative for chills, fatigue, fever and unexpected weight change.   Respiratory:  Negative for cough, shortness of breath, wheezing and stridor.    Cardiovascular:  Negative for chest pain, palpitations and leg swelling.   Gastrointestinal:  Negative for abdominal distention, abdominal pain, constipation, diarrhea, nausea and vomiting.   Genitourinary:  Negative for difficulty urinating, dysuria, frequency, hematuria and urgency.   Skin:  Negative for color change, pallor, rash and wound.   Hematological:  Does not bruise/bleed easily.          Objective     Vital Signs (Most Recent)        108 kg (238 lb 1.6 oz)     Physical Exam:  Physical Exam  Vitals reviewed.   Constitutional:       General: She is not in acute distress.     Appearance: She is well-developed. She is not toxic-appearing.   HENT:      Head: Normocephalic and atraumatic.      Right Ear: External ear normal.      Left Ear: External ear normal.      Nose: Nose normal.      Mouth/Throat:      Mouth: Mucous membranes are moist.   Eyes:      Extraocular Movements: Extraocular movements intact.      Conjunctiva/sclera: Conjunctivae normal.   Cardiovascular:      Rate and Rhythm: Normal rate.   Pulmonary:      Effort: Pulmonary effort is normal. No respiratory distress.   Abdominal:      Comments: Abdomen is soft, nontender, nondistended on examination today.  Incisions are clean dry and intact, healing well without signs of infection.    Musculoskeletal:      Cervical back: Neck supple.   Skin:     General: Skin is warm and dry.   Neurological:      Mental Status: She is alert and oriented to person, place, and time.   Psychiatric:         Behavior: Behavior normal.         Pathology:      Component 3 wk ago   Final Pathologic Diagnosis Gallbladder (cholecystectomy):  Cholelithiasis with chronic cholecystitis           Assessment and Plan     34-year-old female with chronic epigastric postprandial pain found to have symptomatic cholelithiasis now status post robotic cholecystectomy who is recovering as expected.      -karla-incisional soreness is not unexpected and should continue to improve.  Advised to call should her symptoms worsening, not resolve, or become similar to her preoperative symptoms.  She voiced understanding.  - pathology reviewed as above, benign.    -no further interventions or restrictions  -return to clinic as needed or if any issues arise.      Rubina Nails PA-C  [facility]

## 2025-01-03 ENCOUNTER — PATIENT MESSAGE (OUTPATIENT)
Dept: SURGERY | Facility: HOSPITAL | Age: 35
End: 2025-01-03
Payer: MEDICAID

## 2025-02-06 ENCOUNTER — OFFICE VISIT (OUTPATIENT)
Dept: PRIMARY CARE CLINIC | Facility: CLINIC | Age: 35
End: 2025-02-06
Payer: MEDICAID

## 2025-02-06 VITALS
HEART RATE: 67 BPM | TEMPERATURE: 98 F | WEIGHT: 240 LBS | BODY MASS INDEX: 39.99 KG/M2 | OXYGEN SATURATION: 99 % | DIASTOLIC BLOOD PRESSURE: 82 MMHG | SYSTOLIC BLOOD PRESSURE: 132 MMHG | HEIGHT: 65 IN

## 2025-02-06 DIAGNOSIS — E66.812 CLASS 2 OBESITY WITHOUT SERIOUS COMORBIDITY WITH BODY MASS INDEX (BMI) OF 39.0 TO 39.9 IN ADULT, UNSPECIFIED OBESITY TYPE: Primary | Chronic | ICD-10-CM

## 2025-02-06 PROCEDURE — 99999 PR PBB SHADOW E&M-EST. PATIENT-LVL III: CPT | Mod: PBBFAC,,, | Performed by: FAMILY MEDICINE

## 2025-02-06 PROCEDURE — 99213 OFFICE O/P EST LOW 20 MIN: CPT | Mod: PBBFAC,PN | Performed by: FAMILY MEDICINE

## 2025-02-09 NOTE — PROGRESS NOTES
Subjective:      Chief Complaint   Patient presents with    Other Misc     Discuss weight loss after having gallbladder removed      Patient ID: Nadia Mary is a 35 y.o. female.  History of Present Illness      WEIGHT AND EXERCISE:  She reports weight maintaining around 238-240 lbs post-surgery. She experienced a setback in weight management after back surgery due to resuming Sprite consumption. Exercise has been irregular since having her oldest child 7 years ago, currently limited to walks 3 times weekly.    GI CONCERNS:  She experiences diarrhea which is exacerbated by fried foods. Morning coffee consumption consistently causes stomach upset.    SOCIAL HISTORY:  She works part-time as a .    Answers submitted by the patient for this visit:  Review of Systems Questionnaire (Submitted on 2/3/2025)  activity change: No  unexpected weight change: No  neck pain: No  hearing loss: No  rhinorrhea: No  trouble swallowing: No  eye discharge: No  visual disturbance: No  chest tightness: No  wheezing: No  chest pain: No  palpitations: No  blood in stool: No  constipation: No  vomiting: No  diarrhea: No  polydipsia: No  polyuria: No  difficulty urinating: No  hematuria: No  menstrual problem: No  dysuria: No  joint swelling: No  arthralgias: No  headaches: No  weakness: No  confusion: No  dysphoric mood: No    ROS:  General: -fever, -chills, -fatigue, -weight gain, -weight loss  Eyes: -vision changes, -redness, -discharge  ENT: -ear pain, -nasal congestion, -sore throat  Cardiovascular: -chest pain, -palpitations, -lower extremity edema  Respiratory: -cough, -shortness of breath  Gastrointestinal: -abdominal pain, -nausea, -vomiting, +diarrhea, -constipation, -blood in stool  Genitourinary: -dysuria, -hematuria, -frequency  Musculoskeletal: -joint pain, -muscle pain  Skin: -rash, -lesion  Neurological: -headache, -dizziness, -numbness, -tingling  Psychiatric: -anxiety, -depression, -sleep difficulty        Nadia Mary's allergies, medications, history, and problem list were updated as appropriate.  Past Medical History:   Diagnosis Date    Breast disorder      Family History   Problem Relation Name Age of Onset    Diabetes Mother      Breast cancer Mother      Diabetes Maternal Grandmother      Diabetes Maternal Grandfather      Diabetes Paternal Grandmother       Social History     Socioeconomic History    Marital status: Single   Tobacco Use    Smoking status: Never     Passive exposure: Never    Smokeless tobacco: Never   Substance and Sexual Activity    Alcohol use: No    Drug use: No    Sexual activity: Yes     Partners: Male     Birth control/protection: None     Social Drivers of Health     Financial Resource Strain: Low Risk  (2/3/2025)    Overall Financial Resource Strain (CARDIA)     Difficulty of Paying Living Expenses: Not very hard   Food Insecurity: No Food Insecurity (2/3/2025)    Hunger Vital Sign     Worried About Running Out of Food in the Last Year: Never true     Ran Out of Food in the Last Year: Never true   Transportation Needs: No Transportation Needs (1/24/2023)    [facility]    [facility]     Lack of Transportation (Medical): No     Lack of Transportation (Non-Medical): No   Physical Activity: Insufficiently Active (2/3/2025)    Exercise Vital Sign     Days of Exercise per Week: 3 days     Minutes of Exercise per Session: 40 min   Stress: No Stress Concern Present (2/3/2025)    [employer] [place] [employer] - Occupational Stress Questionnaire     Feeling of Stress : Not at all   Housing Stability: Unknown (2/3/2025)    Housing Stability Vital Sign     Unable to Pay for Housing in the Last Year: No     Outpatient Encounter Medications as of 2/6/2025   Medication Sig Dispense Refill    HYDROcodone-acetaminophen (NORCO)  mg per tablet Take 1 tablet by mouth every 6 (six) hours as needed for Pain. (Patient not taking: Reported on 2/6/2025) 15  "tablet 0    ibuprofen (ADVIL,MOTRIN) 800 MG tablet Take 1 tablet (800 mg total) by mouth 3 (three) times daily as needed. (Patient not taking: Reported on 2/6/2025) 30 tablet 0     No facility-administered encounter medications on file as of 2/6/2025.          Objective:      /82   Pulse 67   Temp 97.9 °F (36.6 °C)   Ht 5' 5" (1.651 m)   Wt 108.9 kg (240 lb)   LMP 01/29/2025 (Exact Date)   SpO2 99%   BMI 39.94 kg/m²   Physical Exam   Physical Exam           CONSTITUTIONAL: No apparent distress. Appears comfortable. Does not appear acutely ill or septic. Appears adequately hydrated.  CARDIOVASCULAR: No perioral cyanosis  PULMONARY: Breathing unlabored. No retractions Chest expansion grossly normal.  PSYCHIATRIC: Alert and conversant and grossly oriented. Mood is grossly neutral. Affect appropriate. Judgment and insight grossly intact.  NEUROLOGIC: No focal sensory deficits reported.   Results for orders placed or performed during the hospital encounter of 11/01/24   POCT urine pregnancy    Collection Time: 11/01/24  9:46 AM   Result Value Ref Range    POC Preg Test, Ur Negative Negative     Acceptable Yes    Specimen to Pathology, Surgery General Surgery    Collection Time: 11/01/24  1:20 PM   Result Value Ref Range    Final Pathologic Diagnosis       Gallbladder (cholecystectomy):  Cholelithiasis with chronic cholecystitis      Gross       Surgery ID:  [id number]   Pathology ID:  [id number]  1. Received in formalin labeled &quot;gallbladder with stones&quot; is a 7.4 x 2.2 x 1.5 cm intact gallbladder.  The serosa is pink-red.  The lumen contains hemorrhagic bile and a 2.6 x 1.8 x 1.5 cm green, roughened calculus.  The mucosa is red and   velvety.  The wall thickness averages 0.2 cm.  The cystic duct margin is inked blue.  Representative sections are submitted in cassette 1A.    XNO--1-A        Grossed by: Lucia Arroyo MS, PA(ASCP)      Disclaimer       Unless the case is a 'gross " only' or additional testing only, the final diagnosis for each specimen is based on a microscopic examination of appropriate tissue sections.     Assessment/Plan:         1. Class 2 obesity without serious comorbidity with body mass index (BMI) of 39.0 to 39.9 in adult, unspecified obesity type    2. BMI 39.0-39.9,adult      Class 2 obesity without serious comorbidity with body mass index (BMI) of 39.0 to 39.9 in adult, unspecified obesity type  Comments:  no change, resource given to pt for food ideas, portion and weight mgt at home    BMI 39.0-39.9,adult  Comments:  no lifestyle modification advised    WEIGHT MANAGEMENT:  - Assessed the patient's weight management progress; noted stable weight post-surgery at 238-240 lbs.  - Developed a comprehensive weight loss protocol combining diet, activity, and potential medication.  - Explained weight loss as a lifestyle change, emphasizing diet's importance  -- Educated the patient on the importance of hydration, especially post-surgery.  - Provided information on low-carb snack options and meal planning for weight loss.  - Instructed the patient to complete a food log for 2 weeks to track current diet.  - Recommend walking for exercise, aiming for 30-35 minutes with an elevated heart rate for at least 10 minutes, 3 times per week.  - Provided educational materials on carbohydrates and snack choices for weight loss.  - Noted that the patient reports drinking Sprite (soda) after back surgery.  - Nadia to implement dietary changes and record on subsequent log pages.  - Nadia to continue walking exercises 3 times per week.  - Recommend aiming for 30-35 minutes of walking, ensuring heart rate elevation for at least 10 minutes.    FOLLOW UP:  - Follow up in 6 weeks for weight check and review of diet log.            I have reviewed all of the patient's clinical history available in care everywhere and Epic and have utilized this in my evaluation and management  recommendations today.      Treatment options and alternatives were discussed with the patient. Patient was given ample time to ask questions. All questions were answered. Voices understanding and acceptance of this advice. Will call back if any further questions or concerns.         I spent a total of 30 minutes face to face and non-face to face on the date of this visit.This includes time preparing to see the patient (eg, review of tests, notes), obtaining and/or reviewing additional history from an independent historian and/or outside medical records, documenting clinical information in the electronic health record, independently interpreting results and/or communicating results to the patient/family/caregiver, or care coordinator.  Visit today included increased complexity associated with the care of the episodic problem addressed and managing the longitudinal care of the patient due to the serious and/or complex managed problem(s).    This note was generated with the assistance of ambient listening technology. Verbal consent was obtained by the patient and accompanying visitor(s) for the recording of patient appointment to facilitate this note. I attest to having reviewed and edited the generated note for accuracy, though some syntax or spelling errors may persist. Please contact the author of this note for any clarification.            Yun Hickman MD  [facility],

## (undated) DEVICE — SUT MONOCRYL 4.0 PS2 CP496G

## (undated) DEVICE — COVER TIP CURVED SCISSORS XI

## (undated) DEVICE — KIT ANTIFOG W/SPONG & FLUID

## (undated) DEVICE — APPLICATOR CHLORAPREP ORN 26ML

## (undated) DEVICE — COVER LIGHT HANDLE 80/CA

## (undated) DEVICE — DRAPE THREE-QTR REINF 53X77IN

## (undated) DEVICE — SOL NORMAL USPCA 0.9%

## (undated) DEVICE — DRAPE ARM DAVINCI XI

## (undated) DEVICE — TOWEL OR DISP STRL BLUE 4/PK

## (undated) DEVICE — DRAPE LAPSCP CHOLE 122X102X78

## (undated) DEVICE — PACK BASIC SETUP SC BR

## (undated) DEVICE — PENCIL ELECTROCAUTERY W/ HLSTR

## (undated) DEVICE — SYR PLASTIPAK LL 3ML

## (undated) DEVICE — DRAPE COLUMN DAVINCI XI

## (undated) DEVICE — SET PNEUMOCLEAR HEAT HUM SE HF

## (undated) DEVICE — ELECTRODE REM PLYHSV RETURN 9

## (undated) DEVICE — OBTURATOR BLADELESS 8MM XI CLR

## (undated) DEVICE — GLOVE SIGNATURE ESSNTL LTX 7

## (undated) DEVICE — CLIP HEMO-LOK ML

## (undated) DEVICE — SEAL CANN UNIVERSAL 5-12MM

## (undated) DEVICE — HEADREST ROUND DISP FOAM 9IN

## (undated) DEVICE — GLOVE SENSICARE PI GRN 7

## (undated) DEVICE — SOL STRL WATER INJ 1000ML BG

## (undated) DEVICE — BAG TISSUE RETRIEVAL 5MM

## (undated) DEVICE — NDL ECLIPSE SAFETY 23G 1.5IN

## (undated) DEVICE — GOWN POLY REINF BRTH SLV XL

## (undated) DEVICE — ADHESIVE DERMABOND ADVANCED

## (undated) DEVICE — MANIFOLD 4 PORT

## (undated) DEVICE — SUT CTD VICRYL 0 UND BR SUT

## (undated) DEVICE — SCISSOR 5MMX35CM DIRECT DRIVE